# Patient Record
Sex: FEMALE | Race: WHITE | NOT HISPANIC OR LATINO | Employment: FULL TIME | URBAN - METROPOLITAN AREA
[De-identification: names, ages, dates, MRNs, and addresses within clinical notes are randomized per-mention and may not be internally consistent; named-entity substitution may affect disease eponyms.]

---

## 2017-07-21 PROBLEM — F98.8 ATTENTION DEFICIT DISORDER: Status: ACTIVE | Noted: 2017-07-21

## 2017-07-21 PROBLEM — E03.4 HYPOTHYROIDISM DUE TO ACQUIRED ATROPHY OF THYROID: Status: ACTIVE | Noted: 2017-07-21

## 2017-07-21 PROBLEM — F32.A DEPRESSION: Status: ACTIVE | Noted: 2017-07-21

## 2017-12-29 PROBLEM — K40.90 UNILATERAL INGUINAL HERNIA WITHOUT OBSTRUCTION OR GANGRENE: Status: ACTIVE | Noted: 2017-12-29

## 2018-01-19 RX ORDER — BUPROPION HYDROCHLORIDE 150 MG/1
TABLET, EXTENDED RELEASE ORAL
Qty: 30 TABLET | Refills: 0 | OUTPATIENT
Start: 2018-01-19

## 2018-01-24 PROBLEM — F33.41 RECURRENT MAJOR DEPRESSIVE DISORDER, IN PARTIAL REMISSION: Status: ACTIVE | Noted: 2017-07-21

## 2019-05-12 ENCOUNTER — HOSPITAL ENCOUNTER (EMERGENCY)
Facility: HOSPITAL | Age: 49
Discharge: HOME | End: 2019-05-12
Attending: EMERGENCY MEDICINE
Payer: COMMERCIAL

## 2019-05-12 VITALS
OXYGEN SATURATION: 100 % | BODY MASS INDEX: 19 KG/M2 | TEMPERATURE: 98 F | WEIGHT: 128.63 LBS | HEART RATE: 94 BPM | SYSTOLIC BLOOD PRESSURE: 120 MMHG | RESPIRATION RATE: 18 BRPM | DIASTOLIC BLOOD PRESSURE: 67 MMHG

## 2019-05-12 DIAGNOSIS — S39.012A STRAIN OF LUMBAR REGION, INITIAL ENCOUNTER: Primary | ICD-10-CM

## 2019-05-12 LAB
BILIRUB UR QL STRIP: NEGATIVE
CLARITY UR: CLEAR
COLOR UR: YELLOW
GLUCOSE UR QL STRIP: NEGATIVE
HGB UR QL STRIP: NEGATIVE
KETONES UR QL STRIP: NEGATIVE
LEUKOCYTE ESTERASE UR QL STRIP: NEGATIVE
NITRITE UR QL STRIP: NEGATIVE
PH UR STRIP: 6 [PH] (ref 5–8)
PROT UR QL STRIP: NEGATIVE
SP GR UR STRIP: >=1.03 (ref 1–1.03)
URN SPEC COLLECT METH UR: ABNORMAL
UROBILINOGEN UR STRIP-ACNC: NEGATIVE EU/DL

## 2019-05-12 PROCEDURE — 81003 URINALYSIS AUTO W/O SCOPE: CPT

## 2019-05-12 PROCEDURE — 99284 EMERGENCY DEPT VISIT MOD MDM: CPT | Mod: 25

## 2019-05-12 RX ORDER — METHOCARBAMOL 750 MG/1
1500 TABLET, FILM COATED ORAL 3 TIMES DAILY
Qty: 30 TABLET | Refills: 0 | Status: SHIPPED | OUTPATIENT
Start: 2019-05-12 | End: 2019-05-22

## 2019-05-12 RX ORDER — DICLOFENAC SODIUM 50 MG/1
50 TABLET, DELAYED RELEASE ORAL 2 TIMES DAILY
Qty: 20 TABLET | Refills: 0 | Status: SHIPPED | OUTPATIENT
Start: 2019-05-12 | End: 2019-06-18

## 2019-05-12 NOTE — ED PROVIDER NOTES
Encounter Date: 2019       History     Chief Complaint   Patient presents with    Flank Pain     also c/o nausea; history of recurrent UTIs since , has taken multiple rounds of bactrim and cipro     The history is provided by the patient.   Dysuria    This is a recurrent problem. The current episode started several days ago. The problem occurs every urination. The problem has been unchanged. The quality of the pain is described as aching. The pain is at a severity of 3/10. Associated symptoms include frequency, hesitancy, urgency and flank pain. Pertinent negatives include no chills, no sweats, no nausea, no vomiting, no discharge, no hematuria and no possible pregnancy. She has tried nothing for the symptoms.     Review of patient's allergies indicates:  No Known Allergies  Past Medical History:   Diagnosis Date    ADD (attention deficit disorder)     Hypothyroidism     Migraine      Past Surgical History:   Procedure Laterality Date    APPENDECTOMY      BREAST SURGERY      Augmentation      SECTION      HYSTERECTOMY      partial    INGUINAL HERNIA REPAIR Left 2017    KNEE SURGERY Left     REPAIR-HERNIA-INGUINAL-INITIAL (5 YRS+) with mesh Left 2017    Performed by Dennis Bush MD at [facility] OR     Family History   Problem Relation Age of Onset    Diabetes Mother     Colon cancer Mother     Cancer Father         prostate     COPD Maternal Grandmother     Rheum arthritis Maternal Grandmother     Hypothyroidism Maternal Grandmother     Cancer Maternal Grandfather     Diabetes Paternal Grandmother     Diverticulitis Paternal Grandmother     Parkinsonism Paternal Grandmother     Cancer Paternal Grandfather         prostate    Emphysema Paternal Grandfather         non-smoker     Cancer Sister      Social History     Tobacco Use    Smoking status: Never Smoker    Smokeless tobacco: Never Used   Substance Use Topics    Alcohol use: No    Drug use: No      Review of Systems   Constitutional: Negative for chills and fever.   HENT: Negative for sore throat.    Eyes: Negative for photophobia and redness.   Respiratory: Negative for cough and shortness of breath.    Cardiovascular: Negative for chest pain.   Gastrointestinal: Negative for abdominal pain, diarrhea, nausea and vomiting.   Endocrine: Negative for polydipsia and polyphagia.   Genitourinary: Positive for dysuria, flank pain, frequency, hesitancy and urgency. Negative for hematuria.   Musculoskeletal: Negative for arthralgias, back pain and myalgias.   Skin: Negative for rash.   Neurological: Negative for weakness and headaches.   Hematological: Does not bruise/bleed easily.   Psychiatric/Behavioral: The patient is not nervous/anxious.    All other systems reviewed and are negative.      Physical Exam     Initial Vitals [05/12/19 1307]   BP Pulse Resp Temp SpO2   120/67 94 18 98.3 °F (36.8 °C) 100 %      MAP       --         Physical Exam    Nursing note and vitals reviewed.  Constitutional: Vital signs are normal. She appears well-developed and well-nourished. No distress.   HENT:   Head: Normocephalic and atraumatic.   Right Ear: External ear normal.   Left Ear: External ear normal.   Nose: Nose normal.   Mouth/Throat: Oropharynx is clear and moist.   Eyes: Conjunctivae, EOM and lids are normal. Pupils are equal, round, and reactive to light.   Neck: Normal range of motion and full passive range of motion without pain. Neck supple.   Cardiovascular: Normal rate, regular rhythm, S1 normal, S2 normal, normal heart sounds, intact distal pulses and normal pulses.   Pulmonary/Chest: Breath sounds normal. No respiratory distress. She has no wheezes. She has no rales.   Abdominal: Soft. Normal appearance and bowel sounds are normal. She exhibits no distension. There is no tenderness.   Musculoskeletal: Normal range of motion.   Lymphadenopathy:     She has no cervical adenopathy.   Neurological: She is alert and  oriented to person, place, and time. She has normal strength. No cranial nerve deficit or sensory deficit. Coordination and gait normal.   Skin: Skin is warm, dry and intact.   Psychiatric: She has a normal mood and affect. Her speech is normal and behavior is normal. Judgment and thought content normal. Cognition and memory are normal.         ED Course   Procedures  Labs Reviewed   URINALYSIS, REFLEX TO URINE CULTURE - Abnormal; Notable for the following components:       Result Value    Specific Gravity, UA >=1.030 (*)     All other components within normal limits    Narrative:     Preferred Collection Type->Urine, Clean Catch          Imaging Results    None                               Clinical Impression:       ICD-10-CM ICD-9-CM   1. Strain of lumbar region, initial encounter S39.012A 847.2         Disposition:   Disposition: Discharged  Condition: Stable                        GARO Samayoa  05/12/19 1345

## 2020-01-24 PROBLEM — K58.1 IRRITABLE BOWEL SYNDROME WITH CONSTIPATION: Status: ACTIVE | Noted: 2020-01-24

## 2023-03-14 ENCOUNTER — PATIENT MESSAGE (OUTPATIENT)
Dept: UROLOGY | Facility: CLINIC | Age: 53
End: 2023-03-14

## 2023-03-14 ENCOUNTER — OFFICE VISIT (OUTPATIENT)
Dept: UROLOGY | Facility: CLINIC | Age: 53
End: 2023-03-14
Payer: COMMERCIAL

## 2023-03-14 VITALS
WEIGHT: 124.69 LBS | HEART RATE: 101 BPM | HEIGHT: 69 IN | DIASTOLIC BLOOD PRESSURE: 79 MMHG | TEMPERATURE: 97 F | SYSTOLIC BLOOD PRESSURE: 118 MMHG | BODY MASS INDEX: 18.47 KG/M2

## 2023-03-14 DIAGNOSIS — K59.09 OTHER CONSTIPATION: ICD-10-CM

## 2023-03-14 DIAGNOSIS — Z87.440 HISTORY OF RECURRENT UTIS: Primary | ICD-10-CM

## 2023-03-14 PROCEDURE — 99204 OFFICE O/P NEW MOD 45 MIN: CPT | Mod: S$GLB,,, | Performed by: NURSE PRACTITIONER

## 2023-03-14 PROCEDURE — 51798 US URINE CAPACITY MEASURE: CPT | Mod: S$GLB,ICN,, | Performed by: NURSE PRACTITIONER

## 2023-03-14 PROCEDURE — 81002 PR URINALYSIS NONAUTO W/O SCOPE: ICD-10-PCS | Mod: S$GLB,ICN,, | Performed by: NURSE PRACTITIONER

## 2023-03-14 PROCEDURE — 51798 PR MEAS,POST-VOID RES,US,NON-IMAGING: ICD-10-PCS | Mod: S$GLB,ICN,, | Performed by: NURSE PRACTITIONER

## 2023-03-14 PROCEDURE — 99215 OFFICE O/P EST HI 40 MIN: CPT | Mod: PBBFAC | Performed by: NURSE PRACTITIONER

## 2023-03-14 PROCEDURE — 99999 PR PBB SHADOW E&M-EST. PATIENT-LVL V: CPT | Mod: PBBFAC,,, | Performed by: NURSE PRACTITIONER

## 2023-03-14 PROCEDURE — 51798 US URINE CAPACITY MEASURE: CPT | Mod: PBBFAC

## 2023-03-14 PROCEDURE — 99999 PR PBB SHADOW E&M-EST. PATIENT-LVL V: ICD-10-PCS | Mod: PBBFAC,,, | Performed by: NURSE PRACTITIONER

## 2023-03-14 PROCEDURE — 81002 URINALYSIS NONAUTO W/O SCOPE: CPT | Mod: PBBFAC

## 2023-03-14 PROCEDURE — 81002 URINALYSIS NONAUTO W/O SCOPE: CPT | Mod: S$GLB,ICN,, | Performed by: NURSE PRACTITIONER

## 2023-03-14 PROCEDURE — 99204 PR OFFICE/OUTPT VISIT, NEW, LEVL IV, 45-59 MIN: ICD-10-PCS | Mod: S$GLB,,, | Performed by: NURSE PRACTITIONER

## 2023-03-14 NOTE — PATIENT INSTRUCTIONS
Pyelonephritis  Will proceed with renal ultrasound and patient to return to clinic in 4 weeks for re-evaluation    Chronic constipation  A referral for GI has been placed patient is aware that GI will contact them to schedule this appointment.

## 2023-03-14 NOTE — PROGRESS NOTES
Chief Complaint:   Pyelonephritis  Chronic constipation ( IBS)    HPI:   Patient is a 52-year-old female that is presenting with a history of recurrent urinary tract infections and was recently seen in ED for pyelonephritis.  Patient states that she was prescribed Cipro, twice, and is currently asymptomatic.  Urine in clinic is negative and PVR is 0 mL.  No history of gross hematuria or renal stones.  Patient states that she has chronic constipation, only has 2-3 bowel movements a month.  Reports that urine cultures always indicate E coli.    Allergies:  Patient has no known allergies.    Medications:  has a current medication list which includes the following prescription(s): amitriptyline, clonazepam, escitalopram oxalate, levothyroxine, quetiapine, valacyclovir, vyvanse, and ciprofloxacin hcl.    Review of Systems:  General: No fever, chills, fatigability, or weight loss.  Skin: No rashes, itching, or changes in color or texture of skin.  Chest: Denies QUINN, cyanosis, wheezing, cough, and sputum production.  Abdomen: Appetite fine. No weight loss. Denies diarrhea, abdominal pain, hematemesis, or blood in stool.  Reports Chronic constipation  Musculoskeletal: No joint stiffness or swelling. Denies back pain.  : As above.  All other review of systems negative.    PMH:   has a past medical history of ADD (attention deficit disorder), Hypothyroidism, Irritable bowel syndrome (IBS), Migraine, Post Traumatic Stress Disorder, and Therapeutic Drug Monitoring.    PSH:   has a past surgical history that includes  section; Knee surgery (Left); Appendectomy; Breast surgery; Hysterectomy; Inguinal hernia repair (Left, 2017); Colonoscopy (2019); and Augmentation of breast.    FamHx: family history includes BRCA 1/2 in her paternal aunt and paternal aunt; COPD in her maternal grandmother; Cancer in her father, maternal grandfather, paternal grandfather, and sister; Colon cancer in her mother; Diabetes in  her mother and paternal grandmother; Diverticulitis in her paternal grandmother; Emphysema in her paternal grandfather; Hypothyroidism in her maternal grandmother; Parkinsonism in her paternal grandmother; Rheum arthritis in her maternal grandmother.    SocHx:  reports that she has never smoked. She has never used smokeless tobacco. She reports that she does not drink alcohol and does not use drugs.      Physical Exam:  Vitals:    03/14/23 1413   BP: 118/79   Pulse: 101   Temp: 97.4 °F (36.3 °C)     General: A&Ox3, no apparent distress, no deformities  Neck: No masses, normal thyroid  Lungs: normal inspiration, no use of accessory muscles  Heart: normal pulse, no arrhythmias  Abdomen: Soft, NT, ND, no masses, no hernias, no hepatosplenomegaly  Lymphatic: Neck and groin nodes negative  Skin: The skin is warm and dry. No jaundice.    Labs/Studies:   Urine in clinic is negative  PVR is 0 mL  Impression/Plan:   1. Pyelonephritis with a history of recurrent urinary tract infections  Patient was educated on behavior modifications needed, in sexually active females, to decrease risk of pyelonephritis and recurrent cystitis.  Will proceed with renal ultrasound and patient to return to clinic for re-evaluation in 4 weeks.    2. Chronic constipation  A referral for GI has been placed.

## 2023-03-16 ENCOUNTER — HOSPITAL ENCOUNTER (OUTPATIENT)
Dept: RADIOLOGY | Facility: HOSPITAL | Age: 53
Discharge: HOME | End: 2023-03-16
Attending: NURSE PRACTITIONER
Payer: COMMERCIAL

## 2023-03-16 DIAGNOSIS — Z87.440 HISTORY OF RECURRENT UTIS: ICD-10-CM

## 2023-03-16 PROCEDURE — 76770 US EXAM ABDO BACK WALL COMP: CPT | Mod: TC

## 2023-03-16 PROCEDURE — 76770 US RETROPERITONEAL COMPLETE: ICD-10-PCS | Mod: 26,,, | Performed by: RADIOLOGY

## 2023-03-16 PROCEDURE — 76770 US EXAM ABDO BACK WALL COMP: CPT | Mod: 26,,, | Performed by: RADIOLOGY

## 2023-04-03 ENCOUNTER — OFFICE VISIT (OUTPATIENT)
Dept: OBSTETRICS AND GYNECOLOGY | Facility: CLINIC | Age: 53
End: 2023-04-03
Payer: COMMERCIAL

## 2023-04-03 ENCOUNTER — LAB VISIT (OUTPATIENT)
Dept: LAB | Facility: HOSPITAL | Age: 53
End: 2023-04-03
Attending: NURSE PRACTITIONER
Payer: COMMERCIAL

## 2023-04-03 VITALS
WEIGHT: 127.19 LBS | HEIGHT: 69 IN | DIASTOLIC BLOOD PRESSURE: 64 MMHG | BODY MASS INDEX: 18.84 KG/M2 | SYSTOLIC BLOOD PRESSURE: 102 MMHG

## 2023-04-03 DIAGNOSIS — Z11.3 SCREENING EXAMINATION FOR STD (SEXUALLY TRANSMITTED DISEASE): ICD-10-CM

## 2023-04-03 DIAGNOSIS — B00.9 HERPES: Primary | ICD-10-CM

## 2023-04-03 DIAGNOSIS — N94.9 GENITAL LESION, FEMALE: ICD-10-CM

## 2023-04-03 PROCEDURE — 99999 PR PBB SHADOW E&M-EST. PATIENT-LVL III: ICD-10-PCS | Mod: PBBFAC,,, | Performed by: NURSE PRACTITIONER

## 2023-04-03 PROCEDURE — 3008F PR BODY MASS INDEX (BMI) DOCUMENTED: ICD-10-PCS | Mod: CPTII,S$GLB,, | Performed by: NURSE PRACTITIONER

## 2023-04-03 PROCEDURE — 36415 COLL VENOUS BLD VENIPUNCTURE: CPT | Performed by: NURSE PRACTITIONER

## 2023-04-03 PROCEDURE — 1160F RVW MEDS BY RX/DR IN RCRD: CPT | Mod: CPTII,S$GLB,, | Performed by: NURSE PRACTITIONER

## 2023-04-03 PROCEDURE — 1160F PR REVIEW ALL MEDS BY PRESCRIBER/CLIN PHARMACIST DOCUMENTED: ICD-10-PCS | Mod: CPTII,S$GLB,, | Performed by: NURSE PRACTITIONER

## 2023-04-03 PROCEDURE — 99999 PR PBB SHADOW E&M-EST. PATIENT-LVL III: CPT | Mod: PBBFAC,,, | Performed by: NURSE PRACTITIONER

## 2023-04-03 PROCEDURE — 3078F PR MOST RECENT DIASTOLIC BLOOD PRESSURE < 80 MM HG: ICD-10-PCS | Mod: CPTII,S$GLB,, | Performed by: NURSE PRACTITIONER

## 2023-04-03 PROCEDURE — 1159F PR MEDICATION LIST DOCUMENTED IN MEDICAL RECORD: ICD-10-PCS | Mod: CPTII,S$GLB,, | Performed by: NURSE PRACTITIONER

## 2023-04-03 PROCEDURE — 3074F PR MOST RECENT SYSTOLIC BLOOD PRESSURE < 130 MM HG: ICD-10-PCS | Mod: CPTII,S$GLB,, | Performed by: NURSE PRACTITIONER

## 2023-04-03 PROCEDURE — 99203 PR OFFICE/OUTPT VISIT, NEW, LEVL III, 30-44 MIN: ICD-10-PCS | Mod: S$GLB,,, | Performed by: NURSE PRACTITIONER

## 2023-04-03 PROCEDURE — 1159F MED LIST DOCD IN RCRD: CPT | Mod: CPTII,S$GLB,, | Performed by: NURSE PRACTITIONER

## 2023-04-03 PROCEDURE — 99203 OFFICE O/P NEW LOW 30 MIN: CPT | Mod: S$GLB,,, | Performed by: NURSE PRACTITIONER

## 2023-04-03 PROCEDURE — 86694 HERPES SIMPLEX NES ANTBDY: CPT | Performed by: NURSE PRACTITIONER

## 2023-04-03 PROCEDURE — 3078F DIAST BP <80 MM HG: CPT | Mod: CPTII,S$GLB,, | Performed by: NURSE PRACTITIONER

## 2023-04-03 PROCEDURE — 3008F BODY MASS INDEX DOCD: CPT | Mod: CPTII,S$GLB,, | Performed by: NURSE PRACTITIONER

## 2023-04-03 PROCEDURE — 3074F SYST BP LT 130 MM HG: CPT | Mod: CPTII,S$GLB,, | Performed by: NURSE PRACTITIONER

## 2023-04-03 RX ORDER — VALACYCLOVIR HYDROCHLORIDE 1 G/1
1000 TABLET, FILM COATED ORAL DAILY
Qty: 30 TABLET | Refills: 11 | Status: SHIPPED | OUTPATIENT
Start: 2023-04-03 | End: 2023-12-26 | Stop reason: SDUPTHER

## 2023-04-03 NOTE — PROGRESS NOTES
Subjective:       Patient ID: Tamiko Bettencourt is a 52 y.o. female.    Chief Complaint:  STD CHECK    No LMP recorded. Patient has had a hysterectomy.  History of Present Illness    Presents today because she has bump to her vulva and she thinks it may be a herpes breakout.  Also states that she does get fever blisters that she treats with valacyclovir   Desires HSV bloodwork although she has a history of fever blisters  OB History    Para Term  AB Living   2 2 2         SAB IAB Ectopic Multiple Live Births                  # Outcome Date GA Lbr Vinod/2nd Weight Sex Delivery Anes PTL Lv   2 Term            1 Term                Review of Systems  Review of Systems        Objective:    Physical Exam  Genitourinary:     General: Normal vulva.      Exam position: Lithotomy position.      Comments: No bumps seen today         Assessment:     1. Herpes    2. Screening examination for STD (sexually transmitted disease)              Plan:   Tamiko was seen today for std check.    Diagnoses and all orders for this visit:    Herpes  -     valACYclovir (VALTREX) 1000 MG tablet; Take 1 tablet (1,000 mg total) by mouth once daily.    Screening examination for STD (sexually transmitted disease)  -     HERPES SIMPLEX 1 & 2 IGM; Future    Return to clinic for further evaluation if bumps re appear  Desires prescription for valtrex due to fever blisters

## 2023-04-06 LAB — HSV AB, IGM BY EIA: 0.36 INDEX

## 2023-06-06 ENCOUNTER — OFFICE VISIT (OUTPATIENT)
Dept: UROLOGY | Facility: CLINIC | Age: 53
End: 2023-06-06
Payer: COMMERCIAL

## 2023-06-06 VITALS
HEART RATE: 102 BPM | RESPIRATION RATE: 18 BRPM | BODY MASS INDEX: 19.03 KG/M2 | DIASTOLIC BLOOD PRESSURE: 80 MMHG | SYSTOLIC BLOOD PRESSURE: 131 MMHG | WEIGHT: 128.88 LBS

## 2023-06-06 DIAGNOSIS — N39.0 RECURRENT UTI: Primary | ICD-10-CM

## 2023-06-06 PROCEDURE — 87186 SC STD MICRODIL/AGAR DIL: CPT | Performed by: NURSE PRACTITIONER

## 2023-06-06 PROCEDURE — 99999 PR PBB SHADOW E&M-EST. PATIENT-LVL III: ICD-10-PCS | Mod: PBBFAC,,, | Performed by: NURSE PRACTITIONER

## 2023-06-06 PROCEDURE — 3075F SYST BP GE 130 - 139MM HG: CPT | Mod: CPTII,S$GLB,, | Performed by: NURSE PRACTITIONER

## 2023-06-06 PROCEDURE — 87077 CULTURE AEROBIC IDENTIFY: CPT | Performed by: NURSE PRACTITIONER

## 2023-06-06 PROCEDURE — 3079F DIAST BP 80-89 MM HG: CPT | Mod: CPTII,S$GLB,, | Performed by: NURSE PRACTITIONER

## 2023-06-06 PROCEDURE — 99214 PR OFFICE/OUTPT VISIT, EST, LEVL IV, 30-39 MIN: ICD-10-PCS | Mod: S$GLB,,, | Performed by: NURSE PRACTITIONER

## 2023-06-06 PROCEDURE — 3008F PR BODY MASS INDEX (BMI) DOCUMENTED: ICD-10-PCS | Mod: CPTII,S$GLB,, | Performed by: NURSE PRACTITIONER

## 2023-06-06 PROCEDURE — 87086 URINE CULTURE/COLONY COUNT: CPT | Performed by: NURSE PRACTITIONER

## 2023-06-06 PROCEDURE — 3075F PR MOST RECENT SYSTOLIC BLOOD PRESS GE 130-139MM HG: ICD-10-PCS | Mod: CPTII,S$GLB,, | Performed by: NURSE PRACTITIONER

## 2023-06-06 PROCEDURE — 87088 URINE BACTERIA CULTURE: CPT | Performed by: NURSE PRACTITIONER

## 2023-06-06 PROCEDURE — 99999 PR PBB SHADOW E&M-EST. PATIENT-LVL III: CPT | Mod: PBBFAC,,, | Performed by: NURSE PRACTITIONER

## 2023-06-06 PROCEDURE — 3079F PR MOST RECENT DIASTOLIC BLOOD PRESSURE 80-89 MM HG: ICD-10-PCS | Mod: CPTII,S$GLB,, | Performed by: NURSE PRACTITIONER

## 2023-06-06 PROCEDURE — 1159F MED LIST DOCD IN RCRD: CPT | Mod: CPTII,S$GLB,, | Performed by: NURSE PRACTITIONER

## 2023-06-06 PROCEDURE — 3008F BODY MASS INDEX DOCD: CPT | Mod: CPTII,S$GLB,, | Performed by: NURSE PRACTITIONER

## 2023-06-06 PROCEDURE — 1159F PR MEDICATION LIST DOCUMENTED IN MEDICAL RECORD: ICD-10-PCS | Mod: CPTII,S$GLB,, | Performed by: NURSE PRACTITIONER

## 2023-06-06 PROCEDURE — 99214 OFFICE O/P EST MOD 30 MIN: CPT | Mod: S$GLB,,, | Performed by: NURSE PRACTITIONER

## 2023-06-06 RX ORDER — CIPROFLOXACIN 500 MG/1
500 TABLET ORAL 2 TIMES DAILY
Qty: 14 TABLET | Refills: 0 | Status: SHIPPED | OUTPATIENT
Start: 2023-06-06 | End: 2023-06-13

## 2023-06-06 NOTE — PROGRESS NOTES
Chief Complaint:   Recurrent E coli cystitis    HPI:   Patient is a 52-year-old female with a history of E coli cystitis and pyelonephritis.  Patient was seen several months ago and was to return to clinic for a re-evaluation, unfortunately, she was lost to follow-up presenting to urgent care setting.  Patient was prescribed multiple antibiotics, no urine cultures to review.  Urine in clinic indicates leukocytes and nitrates, all other parameters are negative.  Patient has a CT abdomen and pelvis scheduled for this Friday, secondary to lower abdominal pain.  Denies gross hematuria.  03/14/2023  Patient is a 52-year-old female that is presenting with a history of recurrent urinary tract infections and was recently seen in ED for pyelonephritis.  Patient states that she was prescribed Cipro, twice, and is currently asymptomatic.  Urine in clinic is negative and PVR is 0 mL.  No history of gross hematuria or renal stones. Patient states that she has chronic constipation, only has 2-3 bowel movements a month.  Reports that urine cultures always indicate E coli.         Allergies:  Patient has no known allergies.    Medications:  has a current medication list which includes the following prescription(s): amitriptyline, clonazepam, escitalopram oxalate, levothyroxine, quetiapine, vyvanse, ciprofloxacin hcl, and valacyclovir.    Review of Systems:  General: No fever, chills, fatigability, or weight loss.  Skin: No rashes, itching, or changes in color or texture of skin.  Chest: Denies QUINN, cyanosis, wheezing, cough, and sputum production.  Abdomen: Appetite fine. No weight loss. Denies diarrhea, abdominal pain, hematemesis, or blood in stool.  Musculoskeletal: No joint stiffness or swelling. Denies back pain.  : As above.  All other review of systems negative.    PMH:   has a past medical history of ADD (attention deficit disorder), Hypothyroidism, Irritable bowel syndrome (IBS), Migraine, Post Traumatic Stress Disorder,  and Therapeutic Drug Monitoring.    PSH:   has a past surgical history that includes  section; Knee surgery (Left); Appendectomy; Breast surgery; Hysterectomy (2010); Inguinal hernia repair (Left, 2017); Colonoscopy (2019); and Augmentation of breast.    FamHx: family history includes Breast cancer in her paternal aunt, paternal aunt and another family member; COPD in her maternal grandmother; Cancer in her maternal grandfather and sister; Colon cancer in her mother; Diabetes in her mother and paternal grandmother; Diverticulitis in her paternal grandmother; Emphysema in her paternal grandfather; Hypothyroidism in her maternal grandmother; Parkinsonism in her paternal grandmother; Prostate cancer in her father and paternal grandfather; Rheum arthritis in her maternal grandmother.    SocHx:  reports that she has never smoked. She has never used smokeless tobacco. She reports that she does not drink alcohol and does not use drugs.      Physical Exam:  Vitals:    23 1449   BP: 131/80   Pulse: 102   Resp: 18     General: A&Ox3, no apparent distress, no deformities  Neck: No masses, normal thyroid  Lungs: normal inspiration, no use of accessory muscles  Heart: normal pulse, no arrhythmias  Abdomen: Soft, NT, ND, no masses, no hernias, no hepatosplenomegaly  Lymphatic: Neck and groin nodes negative  Skin: The skin is warm and dry. No jaundice.    Labs/Studies:   See HPI    Impression/Plan:   Recurrent E coli cystitis  I will review CT scan abdomen and pelvis results and contact patient to discuss.  Urine was sent for culture and patient was empirically treated with Cipro.  Patient to return to clinic in 2 weeks for re-evaluation, at that visit, I will consider Estrace cream and Hiprex.

## 2023-06-09 LAB — BACTERIA UR CULT: ABNORMAL

## 2023-06-13 ENCOUNTER — PATIENT MESSAGE (OUTPATIENT)
Dept: UROLOGY | Facility: CLINIC | Age: 53
End: 2023-06-13
Payer: COMMERCIAL

## 2023-06-19 ENCOUNTER — PATIENT MESSAGE (OUTPATIENT)
Dept: UROLOGY | Facility: CLINIC | Age: 53
End: 2023-06-19
Payer: COMMERCIAL

## 2023-06-21 ENCOUNTER — OFFICE VISIT (OUTPATIENT)
Dept: UROLOGY | Facility: CLINIC | Age: 53
End: 2023-06-21
Payer: COMMERCIAL

## 2023-06-21 VITALS
HEART RATE: 120 BPM | HEIGHT: 69 IN | SYSTOLIC BLOOD PRESSURE: 116 MMHG | DIASTOLIC BLOOD PRESSURE: 77 MMHG | BODY MASS INDEX: 18.97 KG/M2 | WEIGHT: 128.06 LBS | RESPIRATION RATE: 18 BRPM

## 2023-06-21 DIAGNOSIS — N39.0 RECURRENT UTI: Primary | ICD-10-CM

## 2023-06-21 LAB
BILIRUB SERPL-MCNC: NEGATIVE MG/DL
BLOOD URINE, POC: NEGATIVE
CLARITY, POC UA: CLEAR
COLOR, POC UA: YELLOW
GLUCOSE UR QL STRIP: NEGATIVE
KETONES UR QL STRIP: NEGATIVE
LEUKOCYTE ESTERASE URINE, POC: NORMAL
NITRITE, POC UA: NEGATIVE
PH, POC UA: 6
PROTEIN, POC: NEGATIVE
SPECIFIC GRAVITY, POC UA: 1.02
UROBILINOGEN, POC UA: NORMAL

## 2023-06-21 PROCEDURE — 3078F DIAST BP <80 MM HG: CPT | Mod: CPTII,S$GLB,, | Performed by: NURSE PRACTITIONER

## 2023-06-21 PROCEDURE — 3008F PR BODY MASS INDEX (BMI) DOCUMENTED: ICD-10-PCS | Mod: CPTII,S$GLB,, | Performed by: NURSE PRACTITIONER

## 2023-06-21 PROCEDURE — 99214 OFFICE O/P EST MOD 30 MIN: CPT | Mod: S$GLB,,, | Performed by: NURSE PRACTITIONER

## 2023-06-21 PROCEDURE — 99214 PR OFFICE/OUTPT VISIT, EST, LEVL IV, 30-39 MIN: ICD-10-PCS | Mod: S$GLB,,, | Performed by: NURSE PRACTITIONER

## 2023-06-21 PROCEDURE — 81002 POCT URINE DIPSTICK WITHOUT MICROSCOPE: ICD-10-PCS | Mod: S$GLB,,, | Performed by: NURSE PRACTITIONER

## 2023-06-21 PROCEDURE — 1159F MED LIST DOCD IN RCRD: CPT | Mod: CPTII,S$GLB,, | Performed by: NURSE PRACTITIONER

## 2023-06-21 PROCEDURE — 3008F BODY MASS INDEX DOCD: CPT | Mod: CPTII,S$GLB,, | Performed by: NURSE PRACTITIONER

## 2023-06-21 PROCEDURE — 3074F SYST BP LT 130 MM HG: CPT | Mod: CPTII,S$GLB,, | Performed by: NURSE PRACTITIONER

## 2023-06-21 PROCEDURE — 3074F PR MOST RECENT SYSTOLIC BLOOD PRESSURE < 130 MM HG: ICD-10-PCS | Mod: CPTII,S$GLB,, | Performed by: NURSE PRACTITIONER

## 2023-06-21 PROCEDURE — 99999 PR PBB SHADOW E&M-EST. PATIENT-LVL III: ICD-10-PCS | Mod: PBBFAC,,, | Performed by: NURSE PRACTITIONER

## 2023-06-21 PROCEDURE — 1159F PR MEDICATION LIST DOCUMENTED IN MEDICAL RECORD: ICD-10-PCS | Mod: CPTII,S$GLB,, | Performed by: NURSE PRACTITIONER

## 2023-06-21 PROCEDURE — 3078F PR MOST RECENT DIASTOLIC BLOOD PRESSURE < 80 MM HG: ICD-10-PCS | Mod: CPTII,S$GLB,, | Performed by: NURSE PRACTITIONER

## 2023-06-21 PROCEDURE — 99999 PR PBB SHADOW E&M-EST. PATIENT-LVL III: CPT | Mod: PBBFAC,,, | Performed by: NURSE PRACTITIONER

## 2023-06-21 PROCEDURE — 81002 URINALYSIS NONAUTO W/O SCOPE: CPT | Mod: S$GLB,,, | Performed by: NURSE PRACTITIONER

## 2023-06-21 RX ORDER — NITROFURANTOIN 25; 75 MG/1; MG/1
100 CAPSULE ORAL NIGHTLY
Qty: 90 CAPSULE | Refills: 0 | Status: SHIPPED | OUTPATIENT
Start: 2023-06-21 | End: 2023-09-08

## 2023-06-21 NOTE — PROGRESS NOTES
Chief Complaint:   Recurrent urinary tract infections    HPI:   Patient is a 52-year-old female that is presenting as a follow-up to recurrent urinary tract infections.  Patient recently was treated for E coli cystitis.  Urine in clinic is negative and PVR is 13 mL.  Patient is currently asymptomatic.  Had a recent CT abdomen pelvis that was negative for renal abnormalities.  Patient states that she has severe constipation and only defecates every 10-12 days.  Reports that E coli UTIs are associated with her bowel prep for defecation.  03/14/2023  Patient is a 52-year-old female that is presenting with a history of recurrent urinary tract infections and was recently seen in ED for pyelonephritis.  Patient states that she was prescribed Cipro, twice, and is currently asymptomatic.  Urine in clinic is negative and PVR is 0 mL.  No history of gross hematuria or renal stones.  Patient states that she has chronic constipation, only has 2-3 bowel movements a month.  Reports that urine cultures always indicate E coli.    Allergies:  Patient has no known allergies.    Medications:  has a current medication list which includes the following prescription(s): amitriptyline, clonazepam, escitalopram oxalate, levothyroxine, quetiapine, vyvanse, nitrofurantoin (macrocrystal-monohydrate), and valacyclovir.    Review of Systems:  General: No fever, chills, fatigability, or weight loss.  Skin: No rashes, itching, or changes in color or texture of skin.  Chest: Denies QUINN, cyanosis, wheezing, cough, and sputum production.  Abdomen: Appetite fine. No weight loss. Denies diarrhea, abdominal pain, hematemesis, or blood in stool.  Severe constipation  Musculoskeletal: No joint stiffness or swelling. Denies back pain.  : As above.  All other review of systems negative.    PMH:   has a past medical history of ADD (attention deficit disorder), Hypothyroidism, Irritable bowel syndrome (IBS), Migraine, Post Traumatic Stress Disorder, and  Therapeutic Drug Monitoring.    PSH:   has a past surgical history that includes  section; Knee surgery (Left); Appendectomy; Breast surgery; Hysterectomy (2010); Inguinal hernia repair (Left, 2017); Colonoscopy (2019); and Augmentation of breast.    FamHx: family history includes Breast cancer in her paternal aunt, paternal aunt and another family member; COPD in her maternal grandmother; Cancer in her maternal grandfather and sister; Colon cancer in her mother; Diabetes in her mother and paternal grandmother; Diverticulitis in her paternal grandmother; Emphysema in her paternal grandfather; Hypothyroidism in her maternal grandmother; Parkinsonism in her paternal grandmother; Prostate cancer in her father and paternal grandfather; Rheum arthritis in her maternal grandmother.    SocHx:  reports that she has never smoked. She has never used smokeless tobacco. She reports that she does not drink alcohol and does not use drugs.      Physical Exam:  Vitals:    23 1554   BP: 116/77   Pulse: (!) 120   Resp: 18     General: A&Ox3, no apparent distress, no deformities  Neck: No masses, normal thyroid  Lungs: normal inspiration, no use of accessory muscles  Heart: normal pulse, no arrhythmias  Abdomen: Soft, NT, ND, no masses, no hernias, no hepatosplenomegaly  Lymphatic: Neck and groin nodes negative  Labs/Studies:   See HPI    Impression/Plan:   Recurrent urinary tract Infections   I reached out to GI to see if patient can get a sooner appointment been scheduled for recurrent UTIs associated with E coli and severe constipation.  Patient was prescribed nightly Macrodantin 100 mg for 3 months and return to clinic in 4 months for re-evaluation.  Hopefully, she can resolve the constipation within the 3 month time frame and we can discontinue the nightly antibiotics.

## 2023-07-13 ENCOUNTER — OFFICE VISIT (OUTPATIENT)
Dept: UROLOGY | Facility: CLINIC | Age: 53
End: 2023-07-13
Payer: COMMERCIAL

## 2023-07-13 ENCOUNTER — PATIENT MESSAGE (OUTPATIENT)
Dept: INFECTIOUS DISEASES | Facility: CLINIC | Age: 53
End: 2023-07-13
Payer: COMMERCIAL

## 2023-07-13 VITALS
SYSTOLIC BLOOD PRESSURE: 128 MMHG | WEIGHT: 128.06 LBS | BODY MASS INDEX: 18.97 KG/M2 | HEART RATE: 114 BPM | HEIGHT: 69 IN | DIASTOLIC BLOOD PRESSURE: 80 MMHG

## 2023-07-13 DIAGNOSIS — N39.0 RECURRENT UTI: Primary | ICD-10-CM

## 2023-07-13 PROCEDURE — 3074F SYST BP LT 130 MM HG: CPT | Mod: CPTII,S$GLB,, | Performed by: NURSE PRACTITIONER

## 2023-07-13 PROCEDURE — 1159F MED LIST DOCD IN RCRD: CPT | Mod: CPTII,S$GLB,, | Performed by: NURSE PRACTITIONER

## 2023-07-13 PROCEDURE — 3079F DIAST BP 80-89 MM HG: CPT | Mod: CPTII,S$GLB,, | Performed by: NURSE PRACTITIONER

## 2023-07-13 PROCEDURE — 87186 SC STD MICRODIL/AGAR DIL: CPT | Performed by: NURSE PRACTITIONER

## 2023-07-13 PROCEDURE — 3079F PR MOST RECENT DIASTOLIC BLOOD PRESSURE 80-89 MM HG: ICD-10-PCS | Mod: CPTII,S$GLB,, | Performed by: NURSE PRACTITIONER

## 2023-07-13 PROCEDURE — 99999 PR PBB SHADOW E&M-EST. PATIENT-LVL IV: CPT | Mod: PBBFAC,,, | Performed by: NURSE PRACTITIONER

## 2023-07-13 PROCEDURE — 3008F PR BODY MASS INDEX (BMI) DOCUMENTED: ICD-10-PCS | Mod: CPTII,S$GLB,, | Performed by: NURSE PRACTITIONER

## 2023-07-13 PROCEDURE — 3074F PR MOST RECENT SYSTOLIC BLOOD PRESSURE < 130 MM HG: ICD-10-PCS | Mod: CPTII,S$GLB,, | Performed by: NURSE PRACTITIONER

## 2023-07-13 PROCEDURE — 87088 URINE BACTERIA CULTURE: CPT | Performed by: NURSE PRACTITIONER

## 2023-07-13 PROCEDURE — 99999 PR PBB SHADOW E&M-EST. PATIENT-LVL IV: ICD-10-PCS | Mod: PBBFAC,,, | Performed by: NURSE PRACTITIONER

## 2023-07-13 PROCEDURE — 87086 URINE CULTURE/COLONY COUNT: CPT | Performed by: NURSE PRACTITIONER

## 2023-07-13 PROCEDURE — 99214 OFFICE O/P EST MOD 30 MIN: CPT | Mod: S$GLB,,, | Performed by: NURSE PRACTITIONER

## 2023-07-13 PROCEDURE — 87077 CULTURE AEROBIC IDENTIFY: CPT | Performed by: NURSE PRACTITIONER

## 2023-07-13 PROCEDURE — 1159F PR MEDICATION LIST DOCUMENTED IN MEDICAL RECORD: ICD-10-PCS | Mod: CPTII,S$GLB,, | Performed by: NURSE PRACTITIONER

## 2023-07-13 PROCEDURE — 99214 PR OFFICE/OUTPT VISIT, EST, LEVL IV, 30-39 MIN: ICD-10-PCS | Mod: S$GLB,,, | Performed by: NURSE PRACTITIONER

## 2023-07-13 PROCEDURE — 3008F BODY MASS INDEX DOCD: CPT | Mod: CPTII,S$GLB,, | Performed by: NURSE PRACTITIONER

## 2023-07-13 RX ORDER — CIPROFLOXACIN 500 MG/1
500 TABLET ORAL 2 TIMES DAILY
Qty: 20 TABLET | Refills: 0 | Status: SHIPPED | OUTPATIENT
Start: 2023-07-13 | End: 2023-07-23

## 2023-07-13 NOTE — PROGRESS NOTES
Chief Complaint:   Recurrent E coli cystitis    HPI:   Patient has been followed by this clinic for recurrent E coli cystitis.  Was recently prescribed nightly antibiotics, Macrobid, for 3 months.  Unfortunately, patient reports dysuria and pelvic pain for 2 days.  Urine in clinic indicates nitrates leukocytes and trace blood, all other parameters are negative.  Denies gross hematuria or fever.  Patient has a long history of chronic constipation, defecating every 10-12 days, requiring bowel prep for elimination.  Is followed by GI.  06/23/2023  Patient is a 52-year-old female that is presenting as a follow-up to recurrent urinary tract infections.  Patient recently was treated for E coli cystitis.  Urine in clinic is negative and PVR is 13 mL.  Patient is currently asymptomatic.  Had a recent CT abdomen pelvis that was negative for renal abnormalities.  Patient states that she has severe constipation and only defecates every 10-12 days.  Reports that E coli UTIs are associated with her bowel prep for defecation.  03/14/2023  Patient is a 52-year-old female that is presenting with a history of recurrent urinary tract infections and was recently seen in ED for pyelonephritis.  Patient states that she was prescribed Cipro, twice, and is currently asymptomatic.  Urine in clinic is negative and PVR is 0 mL.  No history of gross hematuria or renal stones.  Patient states that she has chronic constipation, only has 2-3 bowel movements a month.  Reports that urine cultures always indicate E coli.    Allergies:  Patient has no known allergies.    Medications:  has a current medication list which includes the following prescription(s): amitriptyline, clonazepam, escitalopram oxalate, levothyroxine, nitrofurantoin (macrocrystal-monohydrate), quetiapine, vyvanse, and valacyclovir.    Review of Systems:  General: No fever, chills, fatigability, or weight loss.  Skin: No rashes, itching, or changes in color or texture of  skin.  Chest: Denies QUINN, cyanosis, wheezing, cough, and sputum production.  Abdomen: Appetite fine. No weight loss. Denies diarrhea, abdominal pain, hematemesis, or blood in stool.  Musculoskeletal: No joint stiffness or swelling. Denies back pain.  : As above.  All other review of systems negative.    PMH:   has a past medical history of ADD (attention deficit disorder), Hypothyroidism, Irritable bowel syndrome (IBS), Migraine, Post Traumatic Stress Disorder, and Therapeutic Drug Monitoring.    PSH:   has a past surgical history that includes  section; Knee surgery (Left); Appendectomy; Breast surgery; Hysterectomy (); Inguinal hernia repair (Left, 2017); Colonoscopy (2019); and Augmentation of breast.    FamHx: family history includes Breast cancer in her paternal aunt, paternal aunt and another family member; COPD in her maternal grandmother; Cancer in her maternal grandfather and sister; Colon cancer in her mother; Diabetes in her mother and paternal grandmother; Diverticulitis in her paternal grandmother; Emphysema in her paternal grandfather; Hypothyroidism in her maternal grandmother; Parkinsonism in her paternal grandmother; Prostate cancer in her father and paternal grandfather; Rheum arthritis in her maternal grandmother.    SocHx:  reports that she has never smoked. She has never used smokeless tobacco. She reports that she does not drink alcohol and does not use drugs.      Physical Exam:  Vitals:    23 1319   BP: 128/80   Pulse: (!) 114     General: A&Ox3, no apparent distress, no deformities  Neck: No masses, normal thyroid  Lungs: normal inspiration, no use of accessory muscles  Heart: normal pulse, no arrhythmias  Abdomen: Soft, NT, ND, no masses, no hernias, no hepatosplenomegaly  Lymphatic: Neck and groin nodes negative  Skin: The skin is warm and dry. No jaundice.    Labs/Studies:   See HPI    Impression/Plan:   Recurrent E coli cystitis with a history of  pyelonephritis  Patient has not had resolution with nightly antibiotics, therefore, referral for infectious Disease was placed.  Patient to hold Macrobid and Cipro was prescribed and sent to her pharmacy for empirical treatment.  Urine will be sent for culture and I will contact her with final urine culture results.

## 2023-07-15 LAB — BACTERIA UR CULT: ABNORMAL

## 2023-07-17 ENCOUNTER — TELEPHONE (OUTPATIENT)
Dept: UROLOGY | Facility: CLINIC | Age: 53
End: 2023-07-17
Payer: COMMERCIAL

## 2023-07-17 NOTE — TELEPHONE ENCOUNTER
Called pt to inform of results. No answer  ----- Message from Bessy Church NP sent at 7/17/2023  6:28 AM CDT -----  Please contact patient inform her that final urine culture indicated E coli and she is to complete Cipro.  After completion of Cipro she can began her nightly maintenance dose of Macrodantin.

## 2023-07-17 NOTE — TELEPHONE ENCOUNTER
----- Message from Max Castle sent at 7/17/2023 11:32 AM CDT -----  Contact: Shahla  Type:  Patient Returning Call    Who Called:Shahla  Who Left Message for Patient:nurse  Does the patient know what this is regarding?:missed call/restults  Would the patient rather a call back or a response via [facility]? =call  Best Call Back Number:[phone]  Additional Information:

## 2023-07-17 NOTE — TELEPHONE ENCOUNTER
I called patient she is aware to complete Cipro and that Infectious Disease referral has been placed.

## 2023-07-17 NOTE — TELEPHONE ENCOUNTER
----- Message from Max Castle sent at 7/17/2023 11:32 AM CDT -----  Contact: Shahla  Type:  Patient Returning Call    Who Called:Shahla  Who Left Message for Patient:nurse  Does the patient know what this is regarding?:missed call/restults  Would the patient rather a call back or a response via [facility]? =call  Best Call Back Number:[phone]  Additional Information:

## 2023-08-23 ENCOUNTER — OFFICE VISIT (OUTPATIENT)
Dept: UROLOGY | Facility: CLINIC | Age: 53
End: 2023-08-23
Payer: COMMERCIAL

## 2023-08-23 VITALS — SYSTOLIC BLOOD PRESSURE: 123 MMHG | DIASTOLIC BLOOD PRESSURE: 78 MMHG | WEIGHT: 128 LBS | BODY MASS INDEX: 18.9 KG/M2

## 2023-08-23 DIAGNOSIS — N39.0 RECURRENT UTI: Primary | ICD-10-CM

## 2023-08-23 LAB
BILIRUB SERPL-MCNC: NORMAL MG/DL
BLOOD URINE, POC: NORMAL
CLARITY, POC UA: CLEAR
COLOR, POC UA: YELLOW
GLUCOSE UR QL STRIP: NORMAL
KETONES UR QL STRIP: NORMAL
LEUKOCYTE ESTERASE URINE, POC: NORMAL
NITRITE, POC UA: NORMAL
PH, POC UA: 6
PROTEIN, POC: NORMAL
SPECIFIC GRAVITY, POC UA: 1.02
UROBILINOGEN, POC UA: 0.2

## 2023-08-23 PROCEDURE — 99214 OFFICE O/P EST MOD 30 MIN: CPT | Mod: S$GLB,,, | Performed by: NURSE PRACTITIONER

## 2023-08-23 PROCEDURE — 3078F PR MOST RECENT DIASTOLIC BLOOD PRESSURE < 80 MM HG: ICD-10-PCS | Mod: CPTII,S$GLB,, | Performed by: NURSE PRACTITIONER

## 2023-08-23 PROCEDURE — 81002 POCT URINE DIPSTICK WITHOUT MICROSCOPE: ICD-10-PCS | Mod: S$GLB,,, | Performed by: NURSE PRACTITIONER

## 2023-08-23 PROCEDURE — 1159F PR MEDICATION LIST DOCUMENTED IN MEDICAL RECORD: ICD-10-PCS | Mod: CPTII,S$GLB,, | Performed by: NURSE PRACTITIONER

## 2023-08-23 PROCEDURE — 81002 URINALYSIS NONAUTO W/O SCOPE: CPT | Mod: S$GLB,,, | Performed by: NURSE PRACTITIONER

## 2023-08-23 PROCEDURE — 1159F MED LIST DOCD IN RCRD: CPT | Mod: CPTII,S$GLB,, | Performed by: NURSE PRACTITIONER

## 2023-08-23 PROCEDURE — 3074F PR MOST RECENT SYSTOLIC BLOOD PRESSURE < 130 MM HG: ICD-10-PCS | Mod: CPTII,S$GLB,, | Performed by: NURSE PRACTITIONER

## 2023-08-23 PROCEDURE — 99214 PR OFFICE/OUTPT VISIT, EST, LEVL IV, 30-39 MIN: ICD-10-PCS | Mod: S$GLB,,, | Performed by: NURSE PRACTITIONER

## 2023-08-23 PROCEDURE — 3074F SYST BP LT 130 MM HG: CPT | Mod: CPTII,S$GLB,, | Performed by: NURSE PRACTITIONER

## 2023-08-23 PROCEDURE — 99999 PR PBB SHADOW E&M-EST. PATIENT-LVL III: ICD-10-PCS | Mod: PBBFAC,,, | Performed by: NURSE PRACTITIONER

## 2023-08-23 PROCEDURE — 3008F PR BODY MASS INDEX (BMI) DOCUMENTED: ICD-10-PCS | Mod: CPTII,S$GLB,, | Performed by: NURSE PRACTITIONER

## 2023-08-23 PROCEDURE — 3008F BODY MASS INDEX DOCD: CPT | Mod: CPTII,S$GLB,, | Performed by: NURSE PRACTITIONER

## 2023-08-23 PROCEDURE — 3078F DIAST BP <80 MM HG: CPT | Mod: CPTII,S$GLB,, | Performed by: NURSE PRACTITIONER

## 2023-08-23 PROCEDURE — 99999 PR PBB SHADOW E&M-EST. PATIENT-LVL III: CPT | Mod: PBBFAC,,, | Performed by: NURSE PRACTITIONER

## 2023-08-23 RX ORDER — CIPROFLOXACIN 500 MG/1
500 TABLET ORAL
COMMUNITY
End: 2023-09-07

## 2023-08-23 NOTE — PROGRESS NOTES
Chief Complaint:   Recurrent urinary tract infections    HPI:   Patient is a 52-year-old female that has been followed by this clinic for recurrent E coli cystitis.  Was recently seen by urgent care for possible positive urine culture, and empirically treated with Cipro.  Patient is presenting as a follow-up.  Urine in clinic is negative.  Patient has recurrent E coli cystitis despite prophylactic nightly antibiotics.  Patient has chronic constipation that requires bowel prep for elimination.  Is followed by GI.  Has an upcoming appointment with infectious disease.  07/13/2023  Patient has been followed by this clinic for recurrent E coli cystitis.  Was recently prescribed nightly antibiotics, Macrobid, for 3 months.  Unfortunately, patient reports dysuria and pelvic pain for 2 days.  Urine in clinic indicates nitrates leukocytes and trace blood, all other parameters are negative.  Denies gross hematuria or fever.  Patient has a long history of chronic constipation, defecating every 10-12 days, requiring bowel prep for elimination.  Is followed by GI.  06/23/2023  Patient is a 52-year-old female that is presenting as a follow-up to recurrent urinary tract infections.  Patient recently was treated for E coli cystitis.  Urine in clinic is negative and PVR is 13 mL.  Patient is currently asymptomatic.  Had a recent CT abdomen pelvis that was negative for renal abnormalities.  Patient states that she has severe constipation and only defecates every 10-12 days.  Reports that E coli UTIs are associated with her bowel prep for defecation.  03/14/2023  Patient is a 52-year-old female that is presenting with a history of recurrent urinary tract infections and was recently seen in ED for pyelonephritis.  Patient states that she was prescribed Cipro, twice, and is currently asymptomatic.  Urine in clinic is negative and PVR is 0 mL.  No history of gross hematuria or renal stones.  Patient states that she has chronic  constipation, only has 2-3 bowel movements a month.  Reports that urine cultures always indicate E coli.  Allergies:  Patient has no known allergies.    Medications:  has a current medication list which includes the following prescription(s): amitriptyline, ciprofloxacin hcl, clonazepam, escitalopram oxalate, levothyroxine, meth/meblue/sod phos/psal/hyos, nitrofurantoin (macrocrystal-monohydrate), quetiapine, vyvanse, and valacyclovir.    Review of Systems:  General: No fever, chills, fatigability, or weight loss.  Skin: No rashes, itching, or changes in color or texture of skin.  Chest: Denies QUINN, cyanosis, wheezing, cough, and sputum production.  Abdomen: Appetite fine. No weight loss. Denies diarrhea, abdominal pain, hematemesis, or blood in stool.  Musculoskeletal: No joint stiffness or swelling. Denies back pain.  : As above.  All other review of systems negative.    PMH:   has a past medical history of ADD (attention deficit disorder), Hypothyroidism, Irritable bowel syndrome (IBS), Migraine, Post Traumatic Stress Disorder, and Therapeutic Drug Monitoring.    PSH:   has a past surgical history that includes  section; Knee surgery (Left); Appendectomy; Breast surgery; Hysterectomy (); Inguinal hernia repair (Left, 2017); Colonoscopy (2019); and Augmentation of breast.    FamHx: family history includes Breast cancer in her paternal aunt, paternal aunt and another family member; COPD in her maternal grandmother; Cancer in her maternal grandfather and sister; Colon cancer in her mother; Diabetes in her mother and paternal grandmother; Diverticulitis in her paternal grandmother; Emphysema in her paternal grandfather; Hypothyroidism in her maternal grandmother; Parkinsonism in her paternal grandmother; Prostate cancer in her father and paternal grandfather; Rheum arthritis in her maternal grandmother.    SocHx:  reports that she has never smoked. She has never used smokeless tobacco. She  reports that she does not drink alcohol and does not use drugs.      Physical Exam:  Vitals:    08/23/23 1407   BP: 123/78     General: A&Ox3, no apparent distress, no deformities  Neck: No masses, normal thyroid  Lungs: normal inspiration, no use of accessory muscles  Heart: normal pulse, no arrhythmias  Abdomen: Soft, NT, ND, no masses, no hernias, no hepatosplenomegaly  Labs/Studies:     Impression/Plan:   Recurrent E coli cystitis with a history of pyelonephritis  Patient has not had resolution with nightly antibiotics, therefore, referral for infectious Disease was placed.  Patient to hold Macrobid and Cipro was prescribed per urgent care . Has follow-up ID appointment.

## 2023-08-24 ENCOUNTER — PATIENT MESSAGE (OUTPATIENT)
Dept: UROLOGY | Facility: CLINIC | Age: 53
End: 2023-08-24
Payer: COMMERCIAL

## 2023-09-05 ENCOUNTER — OFFICE VISIT (OUTPATIENT)
Dept: UROLOGY | Facility: CLINIC | Age: 53
End: 2023-09-05
Payer: COMMERCIAL

## 2023-09-05 VITALS
RESPIRATION RATE: 18 BRPM | DIASTOLIC BLOOD PRESSURE: 82 MMHG | WEIGHT: 128 LBS | BODY MASS INDEX: 18.9 KG/M2 | HEART RATE: 104 BPM | SYSTOLIC BLOOD PRESSURE: 127 MMHG

## 2023-09-05 DIAGNOSIS — N39.0 RECURRENT UTI: Primary | ICD-10-CM

## 2023-09-05 PROCEDURE — 1159F PR MEDICATION LIST DOCUMENTED IN MEDICAL RECORD: ICD-10-PCS | Mod: CPTII,S$GLB,, | Performed by: NURSE PRACTITIONER

## 2023-09-05 PROCEDURE — 3079F DIAST BP 80-89 MM HG: CPT | Mod: CPTII,S$GLB,, | Performed by: NURSE PRACTITIONER

## 2023-09-05 PROCEDURE — 99999 PR PBB SHADOW E&M-EST. PATIENT-LVL III: CPT | Mod: PBBFAC,,, | Performed by: NURSE PRACTITIONER

## 2023-09-05 PROCEDURE — 3079F PR MOST RECENT DIASTOLIC BLOOD PRESSURE 80-89 MM HG: ICD-10-PCS | Mod: CPTII,S$GLB,, | Performed by: NURSE PRACTITIONER

## 2023-09-05 PROCEDURE — 87186 SC STD MICRODIL/AGAR DIL: CPT | Performed by: NURSE PRACTITIONER

## 2023-09-05 PROCEDURE — 3074F SYST BP LT 130 MM HG: CPT | Mod: CPTII,S$GLB,, | Performed by: NURSE PRACTITIONER

## 2023-09-05 PROCEDURE — 3008F PR BODY MASS INDEX (BMI) DOCUMENTED: ICD-10-PCS | Mod: CPTII,S$GLB,, | Performed by: NURSE PRACTITIONER

## 2023-09-05 PROCEDURE — 99999 PR PBB SHADOW E&M-EST. PATIENT-LVL III: ICD-10-PCS | Mod: PBBFAC,,, | Performed by: NURSE PRACTITIONER

## 2023-09-05 PROCEDURE — 3074F PR MOST RECENT SYSTOLIC BLOOD PRESSURE < 130 MM HG: ICD-10-PCS | Mod: CPTII,S$GLB,, | Performed by: NURSE PRACTITIONER

## 2023-09-05 PROCEDURE — 87077 CULTURE AEROBIC IDENTIFY: CPT | Performed by: NURSE PRACTITIONER

## 2023-09-05 PROCEDURE — 87086 URINE CULTURE/COLONY COUNT: CPT | Performed by: NURSE PRACTITIONER

## 2023-09-05 PROCEDURE — 3008F BODY MASS INDEX DOCD: CPT | Mod: CPTII,S$GLB,, | Performed by: NURSE PRACTITIONER

## 2023-09-05 PROCEDURE — 99214 OFFICE O/P EST MOD 30 MIN: CPT | Mod: S$GLB,,, | Performed by: NURSE PRACTITIONER

## 2023-09-05 PROCEDURE — 99214 PR OFFICE/OUTPT VISIT, EST, LEVL IV, 30-39 MIN: ICD-10-PCS | Mod: S$GLB,,, | Performed by: NURSE PRACTITIONER

## 2023-09-05 PROCEDURE — 1159F MED LIST DOCD IN RCRD: CPT | Mod: CPTII,S$GLB,, | Performed by: NURSE PRACTITIONER

## 2023-09-05 PROCEDURE — 87088 URINE BACTERIA CULTURE: CPT | Performed by: NURSE PRACTITIONER

## 2023-09-05 RX ORDER — PHENAZOPYRIDINE HYDROCHLORIDE 200 MG/1
200 TABLET, FILM COATED ORAL 3 TIMES DAILY PRN
Qty: 15 TABLET | Refills: 0 | Status: SHIPPED | OUTPATIENT
Start: 2023-09-05 | End: 2023-09-11

## 2023-09-05 NOTE — PROGRESS NOTES
Chief Complaint:   Recurrent urinary tract infections    HPI:   Patient is familiar to our clinic secondary to recurrent urinary tract infections.  Patient recently completed antibiotics secondary to Klebsiella positive urine culture.  Patient states that over the last 3 days she is had slight dysuria.  Denies pelvic or flank pain.  Denies gross hematuria or fever.  Urine in clinic indicates leukocytes and nitrates, all other parameters are negative.  Patient has an upcoming appointment with Infectious Disease secondary to 9 positive urine cultures in the past 9 months despite nightly antibiotics.  08/23/2023  Patient is a 52-year-old female that has been followed by this clinic for recurrent E coli cystitis.  Was recently seen by urgent care for possible positive urine culture, and empirically treated with Cipro.  Patient is presenting as a follow-up.  Urine in clinic is negative.  Patient has recurrent E coli cystitis despite prophylactic nightly antibiotics.  Patient has chronic constipation that requires bowel prep for elimination.  Is followed by GI.  Has an upcoming appointment with infectious disease.  07/13/2023  Patient has been followed by this clinic for recurrent E coli cystitis.  Was recently prescribed nightly antibiotics, Macrobid, for 3 months.  Unfortunately, patient reports dysuria and pelvic pain for 2 days.  Urine in clinic indicates nitrates leukocytes and trace blood, all other parameters are negative.  Denies gross hematuria or fever.  Patient has a long history of chronic constipation, defecating every 10-12 days, requiring bowel prep for elimination.  Is followed by GI.  06/23/2023  Patient is a 52-year-old female that is presenting as a follow-up to recurrent urinary tract infections.  Patient recently was treated for E coli cystitis.  Urine in clinic is negative and PVR is 13 mL.  Patient is currently asymptomatic.  Had a recent CT abdomen pelvis that was negative for renal abnormalities.   Patient states that she has severe constipation and only defecates every 10-12 days.  Reports that E coli UTIs are associated with her bowel prep for defecation.  2023  Patient is a 52-year-old female that is presenting with a history of recurrent urinary tract infections and was recently seen in ED for pyelonephritis.  Patient states that she was prescribed Cipro, twice, and is currently asymptomatic.  Urine in clinic is negative and PVR is 0 mL.  No history of gross hematuria or renal stones.  Patient states that she has chronic constipation, only has 2-3 bowel movements a month.  Reports that urine cultures always indicate E coli.  Allergies:  Patient has no known allergies.    Medications:  has a current medication list which includes the following prescription(s): amitriptyline, ciprofloxacin hcl, clonazepam, escitalopram oxalate, levothyroxine, meth/meblue/sod phos/psal/hyos, nitrofurantoin (macrocrystal-monohydrate), quetiapine, vyvanse, phenazopyridine, and valacyclovir.    Review of Systems:  General: No fever, chills, fatigability, or weight loss.  Skin: No rashes, itching, or changes in color or texture of skin.  Chest: Denies QUINN, cyanosis, wheezing, cough, and sputum production.  Abdomen: Appetite fine. No weight loss. Denies diarrhea, abdominal pain, hematemesis, or blood in stool.  Musculoskeletal: No joint stiffness or swelling. Denies back pain.  : As above.  All other review of systems negative.    PMH:   has a past medical history of ADD (attention deficit disorder), Hypothyroidism, Irritable bowel syndrome (IBS), Migraine, Post Traumatic Stress Disorder, and Therapeutic Drug Monitoring.    PSH:   has a past surgical history that includes  section; Knee surgery (Left); Appendectomy; Breast surgery; Hysterectomy (); Inguinal hernia repair (Left, 2017); Colonoscopy (2019); and Augmentation of breast.    FamHx: family history includes Breast cancer in her paternal  aunt, paternal aunt and another family member; COPD in her maternal grandmother; Cancer in her maternal grandfather and sister; Colon cancer in her mother; Diabetes in her mother and paternal grandmother; Diverticulitis in her paternal grandmother; Emphysema in her paternal grandfather; Hypothyroidism in her maternal grandmother; Parkinsonism in her paternal grandmother; Prostate cancer in her father and paternal grandfather; Rheum arthritis in her maternal grandmother.    SocHx:  reports that she has never smoked. She has never used smokeless tobacco. She reports that she does not drink alcohol and does not use drugs.      Physical Exam:  Vitals:    09/05/23 1453   BP: 127/82   Pulse: 104   Resp: 18     General: A&Ox3, no apparent distress, no deformities  Neck: No masses, normal thyroid  Lungs: normal inspiration, no use of accessory muscles  Heart: normal pulse, no arrhythmias  Abdomen: Soft, NT, ND, no masses, no hernias, no hepatosplenomegaly  Lymphatic: Neck and groin nodes negative  Skin: The skin is warm and dry. No jaundice.    Labs/Studies:   See HPI    Impression/Plan:   Recurrent E coli cystitis with a history of pyelonephritis  Patient has not had resolution with nightly antibiotics, therefore, referral for infectious Disease was placed.Urine cx sent.

## 2023-09-08 ENCOUNTER — PATIENT MESSAGE (OUTPATIENT)
Dept: UROLOGY | Facility: CLINIC | Age: 53
End: 2023-09-08
Payer: COMMERCIAL

## 2023-09-08 ENCOUNTER — TELEPHONE (OUTPATIENT)
Dept: UROLOGY | Facility: CLINIC | Age: 53
End: 2023-09-08
Payer: COMMERCIAL

## 2023-09-08 LAB — BACTERIA UR CULT: ABNORMAL

## 2023-09-08 RX ORDER — SULFAMETHOXAZOLE AND TRIMETHOPRIM 800; 160 MG/1; MG/1
1 TABLET ORAL 2 TIMES DAILY
Qty: 20 TABLET | Refills: 0 | Status: SHIPPED | OUTPATIENT
Start: 2023-09-08 | End: 2023-09-18

## 2023-09-11 ENCOUNTER — OFFICE VISIT (OUTPATIENT)
Dept: INFECTIOUS DISEASES | Facility: CLINIC | Age: 53
End: 2023-09-11
Payer: COMMERCIAL

## 2023-09-11 VITALS
DIASTOLIC BLOOD PRESSURE: 88 MMHG | WEIGHT: 128.5 LBS | BODY MASS INDEX: 19.03 KG/M2 | HEIGHT: 69 IN | HEART RATE: 100 BPM | SYSTOLIC BLOOD PRESSURE: 122 MMHG

## 2023-09-11 DIAGNOSIS — K58.1 IRRITABLE BOWEL SYNDROME WITH CONSTIPATION: ICD-10-CM

## 2023-09-11 DIAGNOSIS — N39.0 RECURRENT UTI: ICD-10-CM

## 2023-09-11 PROCEDURE — 99999 PR PBB SHADOW E&M-EST. PATIENT-LVL IV: ICD-10-PCS | Mod: PBBFAC,,, | Performed by: STUDENT IN AN ORGANIZED HEALTH CARE EDUCATION/TRAINING PROGRAM

## 2023-09-11 PROCEDURE — 3008F BODY MASS INDEX DOCD: CPT | Mod: CPTII,S$GLB,, | Performed by: STUDENT IN AN ORGANIZED HEALTH CARE EDUCATION/TRAINING PROGRAM

## 2023-09-11 PROCEDURE — 99204 PR OFFICE/OUTPT VISIT, NEW, LEVL IV, 45-59 MIN: ICD-10-PCS | Mod: S$GLB,,, | Performed by: STUDENT IN AN ORGANIZED HEALTH CARE EDUCATION/TRAINING PROGRAM

## 2023-09-11 PROCEDURE — 3079F PR MOST RECENT DIASTOLIC BLOOD PRESSURE 80-89 MM HG: ICD-10-PCS | Mod: CPTII,S$GLB,, | Performed by: STUDENT IN AN ORGANIZED HEALTH CARE EDUCATION/TRAINING PROGRAM

## 2023-09-11 PROCEDURE — 3074F PR MOST RECENT SYSTOLIC BLOOD PRESSURE < 130 MM HG: ICD-10-PCS | Mod: CPTII,S$GLB,, | Performed by: STUDENT IN AN ORGANIZED HEALTH CARE EDUCATION/TRAINING PROGRAM

## 2023-09-11 PROCEDURE — 1159F PR MEDICATION LIST DOCUMENTED IN MEDICAL RECORD: ICD-10-PCS | Mod: CPTII,S$GLB,, | Performed by: STUDENT IN AN ORGANIZED HEALTH CARE EDUCATION/TRAINING PROGRAM

## 2023-09-11 PROCEDURE — 99999 PR PBB SHADOW E&M-EST. PATIENT-LVL IV: CPT | Mod: PBBFAC,,, | Performed by: STUDENT IN AN ORGANIZED HEALTH CARE EDUCATION/TRAINING PROGRAM

## 2023-09-11 PROCEDURE — 99204 OFFICE O/P NEW MOD 45 MIN: CPT | Mod: S$GLB,,, | Performed by: STUDENT IN AN ORGANIZED HEALTH CARE EDUCATION/TRAINING PROGRAM

## 2023-09-11 PROCEDURE — 1159F MED LIST DOCD IN RCRD: CPT | Mod: CPTII,S$GLB,, | Performed by: STUDENT IN AN ORGANIZED HEALTH CARE EDUCATION/TRAINING PROGRAM

## 2023-09-11 PROCEDURE — 3074F SYST BP LT 130 MM HG: CPT | Mod: CPTII,S$GLB,, | Performed by: STUDENT IN AN ORGANIZED HEALTH CARE EDUCATION/TRAINING PROGRAM

## 2023-09-11 PROCEDURE — 3008F PR BODY MASS INDEX (BMI) DOCUMENTED: ICD-10-PCS | Mod: CPTII,S$GLB,, | Performed by: STUDENT IN AN ORGANIZED HEALTH CARE EDUCATION/TRAINING PROGRAM

## 2023-09-11 PROCEDURE — 3079F DIAST BP 80-89 MM HG: CPT | Mod: CPTII,S$GLB,, | Performed by: STUDENT IN AN ORGANIZED HEALTH CARE EDUCATION/TRAINING PROGRAM

## 2023-09-11 RX ORDER — METHENAMINE HIPPURATE 1000 MG/1
1 TABLET ORAL 2 TIMES DAILY
Qty: 60 TABLET | Refills: 0 | Status: SHIPPED | OUTPATIENT
Start: 2023-09-11 | End: 2023-10-05 | Stop reason: SDUPTHER

## 2023-09-11 NOTE — ASSESSMENT & PLAN NOTE
It is imperative patient have regular bowel movements to prevent recurrent UTI. On laxative currently. Has colonoscopy follow up with GI.

## 2023-09-11 NOTE — ASSESSMENT & PLAN NOTE
--Concerned given ongoing symptomatic UTI and CVA tenderness on left   --Agree with Bactrim for now as this is good agent for pyelonephritis in outpatient setting and patient not exhibiting signs of critical illness/sepsis at this time   --Will send script for methenamine BID to pharmacy   --Recommended patient  vitamin C at pharmacy to take daily for prevention   --Will ask urology to perform cystoscopy to assess for anatomic abnormalities   --Scheduling renal US to look for hydronephrosis/abscess   --Follow up in 2 weeks

## 2023-09-11 NOTE — PROGRESS NOTES
Infectious Disease Clinic Note    Patient Name: Tamiko Bettencourt  YOB: 1970    PRESENTING HISTORY       History of Present Illness:  Ms. Tamiko Bettencourt is a 52 y.o. female w/ significant PMHx of here with recurrent UTI. Reports in February 2023 had really bad ovarian pain. Diagnosed with UTI that had spread to kidneys[place] [facility] Was close to being [place]. Has since had at least 6 positive urine cultures. Has seen urology outpatient and treated as well. Was treated with ciprofloxacin for E coli and then Klebsiella now being treated with Bactrim. Tried Macrobid suppression but UTI still broke through. Minimal resolution so far after few days of Bactrim. Had stress incontinence that was treated with mesh/sling previously. No longer incontinent. Does experience bad constipation relieved with Dulcolax. UTI symptoms include bladder spasms, painful postvoid, cloudiness, malodor, low grade fever, and fatigue. Also has an ongoing discomfort along left side of her lower back. No high grade fever or chills. Discussed a number of OTC and prescription medications to help prevent UTI. Interested in trying methenamine and vitamin C. Would also like urology to perform cystoscopy. Discussed in and out catheterized urinalysis if symptoms persist. Will continue Bactrim for now but may need to consider IV therapy if symptoms worsen.     Review of Systems:  Constitutional: no fever or chills  Eyes: no visual changes  ENT: no nasal congestion or sore throat  Respiratory: no cough or shortness of breath  Cardiovascular: no chest pain  Gastrointestinal: constipation   Genitourinary: dysuria, left flank pain, bladder spasms   Musculoskeletal: no arthralgias or myalgias  Skin: no rash  Neurological: no headaches, numbness, or paresthesias    The following portions of the patient's history were reviewed and updated as appropriate: allergies, current medications, past family history, past medical history, past  social history, past surgical history, and problem list.    PAST HISTORY:     Immunization History   Administered Date(s) Administered    COVID-19, MRNA, LN-S, PF (MODERNA FULL 0.5 ML DOSE) 2021, 2021    Influenza 10/15/2019, 2020    Influenza - Quadrivalent 2017, 10/15/2019    MMR 2020    PPD Test 2019    Tdap 2017, 2020       Past Medical History:   Diagnosis Date    ADD (attention deficit disorder)     Hypothyroidism     Irritable bowel syndrome (IBS)     Migraine     Post Traumatic Stress Disorder     Her Son Was A Marine Who Was Killed While Serving In [email or web address]    Therapeutic Drug Monitoring        Past Surgical History:   Procedure Laterality Date    APPENDECTOMY      AUGMENTATION OF BREAST      BREAST SURGERY      Augmentation      SECTION      COLONOSCOPY  2019    Dr. Wong    HYSTERECTOMY  2010    partial    INGUINAL HERNIA REPAIR Left 2017    KNEE SURGERY Left        Family History   Problem Relation Age of Onset    Diabetes Mother     Colon cancer Mother     Prostate cancer Father     Cancer Sister     Breast cancer Paternal Aunt         age unknown    Breast cancer Paternal Aunt         age unknown    COPD Maternal Grandmother     Rheum arthritis Maternal Grandmother     Hypothyroidism Maternal Grandmother     Cancer Maternal Grandfather     Diabetes Paternal Grandmother     Diverticulitis Paternal Grandmother     Parkinsonism Paternal Grandmother     Prostate cancer Paternal Grandfather     Emphysema Paternal Grandfather         non-smoker     Breast cancer Other         age unknown       Social History     Socioeconomic History    Marital status:    Tobacco Use    Smoking status: Never    Smokeless tobacco: Never   Substance and Sexual Activity    Alcohol use: No    Drug use: No    Sexual activity: Yes     Partners: Male   Social History Narrative    ** Merged History Encounter **            MEDICATIONS & ALLERGIES:  "    Current Outpatient Medications on File Prior to Visit   Medication Sig    amitriptyline (ELAVIL) 50 MG tablet Take 1 tablet (50 mg total) by mouth every evening.    clonazePAM (KLONOPIN) 1 MG tablet TAKE ONE TABLET BY MOUTH THREE TIMES DAILY    EScitalopram oxalate (LEXAPRO) 20 MG tablet TAKE 2 TABLETS BY MOUTH DAILY    levothyroxine (SYNTHROID) 112 MCG tablet TAKE ONE TABLET BY MOUTH ONCE DAILY.    phenazopyridine (PYRIDIUM) 200 MG tablet Take 1 tablet (200 mg total) by mouth 3 (three) times daily as needed for Pain.    QUEtiapine (SEROQUEL) 100 MG Tab TAKE TWO TABLETS BY MOUTH EVERY EVENING    sulfamethoxazole-trimethoprim 800-160mg (BACTRIM DS) 800-160 mg Tab Take 1 tablet by mouth 2 (two) times daily. for 10 days    valACYclovir (VALTREX) 1000 MG tablet Take 1 tablet (1,000 mg total) by mouth once daily. (Patient taking differently: Take 1,000 mg by mouth once daily. As needed)    VYVANSE 50 mg capsule TAKE ONE CAPSULE BY MOUTH IN THE MORNING    promethazine (PHENERGAN) 25 MG tablet Take 25 mg by mouth every 6 (six) hours as needed.    [DISCONTINUED] meth/meblue/sod phos/psal/hyos (URIBEL ORAL) Take by mouth.     No current facility-administered medications on file prior to visit.       Review of patient's allergies indicates:  No Known Allergies    OBJECTIVE:   Vital Signs:  Vitals:    09/11/23 1448   BP: 122/88   Pulse: 100   Weight: 58.3 kg (128 lb 8.5 oz)   Height: 5' 9" (1.753 m)       No results found for this or any previous visit (from the past 24 hour(s)).      Physical Exam:   General:  Well developed, well nourished, no acute distress  HEENT:  Normocephalic, atraumatic  CVS:  RRR, S1 and S2 normal, no murmurs, rubs, gallops  Resp:  Lungs clear to auscultation, no wheezes, rales, rhonchi  GI:  Abdomen soft, non-tender, non-distended, normoactive bowel sounds, no masses  MSK:  No muscle atrophy, peripheral edema, full range of motion; left CVA tenderness   Skin:  No rashes, ulcers, erythema  Psych:  " Alert and oriented to person, place, and time    ASSESSMENT:     Recurrent UTI  --Concerned given ongoing symptomatic UTI and CVA tenderness on left   --Agree with Bactrim for now as this is good agent for pyelonephritis in outpatient setting and patient not exhibiting signs of critical illness/sepsis at this time   --Will send script for methenamine BID to pharmacy   --Recommended patient  vitamin C at pharmacy to take daily for prevention   --Will ask urology to perform cystoscopy to assess for anatomic abnormalities   --Scheduling renal US to look for hydronephrosis/abscess   --Recommend in and out catheterization for next urinalysis   --May need to discuss OPAT if symptoms worsen after current PO course   --Follow up in 2 weeks     Irritable bowel syndrome with constipation  It is imperative patient have regular bowel movements to prevent recurrent UTI. On laxative currently. Has colonoscopy follow up with GI.       PLAN:     Tamiko was seen today for urinary tract infection.    Diagnoses and all orders for this visit:    Recurrent UTI  -     Ambulatory referral/consult to Infectious Disease  -     US Kidney; Future    Irritable bowel syndrome with constipation    Other orders  -     methenamine (HIPREX) 1 gram Tab; Take 1 tablet (1 g total) by mouth 2 (two) times daily.      The total time for evaluation and management services performed on 9/11/23 was greater than 30 minutes.        Beto Bourgeois, DO   Infectious Diseases

## 2023-09-22 ENCOUNTER — APPOINTMENT (OUTPATIENT)
Dept: RADIOLOGY | Facility: HOSPITAL | Age: 53
End: 2023-09-22
Attending: STUDENT IN AN ORGANIZED HEALTH CARE EDUCATION/TRAINING PROGRAM
Payer: COMMERCIAL

## 2023-09-22 DIAGNOSIS — N39.0 RECURRENT UTI: ICD-10-CM

## 2023-09-22 PROCEDURE — 76770 US RETROPERITONEAL COMPLETE: ICD-10-PCS | Mod: 26,,, | Performed by: RADIOLOGY

## 2023-09-22 PROCEDURE — 76770 US EXAM ABDO BACK WALL COMP: CPT | Mod: TC,PO

## 2023-09-22 PROCEDURE — 76770 US EXAM ABDO BACK WALL COMP: CPT | Mod: 26,,, | Performed by: RADIOLOGY

## 2023-09-25 ENCOUNTER — PROCEDURE VISIT (OUTPATIENT)
Dept: UROLOGY | Facility: CLINIC | Age: 53
End: 2023-09-25
Payer: COMMERCIAL

## 2023-09-25 VITALS
HEIGHT: 69 IN | SYSTOLIC BLOOD PRESSURE: 120 MMHG | DIASTOLIC BLOOD PRESSURE: 83 MMHG | BODY MASS INDEX: 18.96 KG/M2 | HEART RATE: 76 BPM | TEMPERATURE: 98 F | WEIGHT: 128 LBS | RESPIRATION RATE: 16 BRPM

## 2023-09-25 DIAGNOSIS — N39.0 RECURRENT UTI: Primary | ICD-10-CM

## 2023-09-25 LAB
BILIRUB UR QL STRIP: NEGATIVE
GLUCOSE UR QL STRIP: NEGATIVE
KETONES UR QL STRIP: NEGATIVE
LEUKOCYTE ESTERASE UR QL STRIP: NEGATIVE
PH, POC UA: 6
POC BLOOD, URINE: NEGATIVE
POC NITRATES, URINE: NEGATIVE
PROT UR QL STRIP: NEGATIVE
SP GR UR STRIP: 1.02 (ref 1–1.03)
UROBILINOGEN UR STRIP-ACNC: 0.2 (ref 0.1–1.1)

## 2023-09-25 PROCEDURE — 52000 CYSTOURETHROSCOPY: CPT | Mod: S$GLB,,, | Performed by: UROLOGY

## 2023-09-25 PROCEDURE — 81003 URINALYSIS AUTO W/O SCOPE: CPT | Mod: QW,S$GLB,, | Performed by: UROLOGY

## 2023-09-25 PROCEDURE — 81003 POCT URINALYSIS, DIPSTICK, AUTOMATED, W/O SCOPE: ICD-10-PCS | Mod: QW,S$GLB,, | Performed by: UROLOGY

## 2023-09-25 PROCEDURE — 52000 CYSTOSCOPY: ICD-10-PCS | Mod: S$GLB,,, | Performed by: UROLOGY

## 2023-09-25 NOTE — PROCEDURES
Cystoscopy    Date/Time: 9/25/2023 10:30 AM    Performed by: Giancarlo Cochran MD  Authorized by: Giancarlo Cochran MD    Consent Done?:  Yes (Written)  Anesthesia:  Intraurethral instillation  Local anesthetic:  Lidocaine 2% topical gel  Indications: recurrent UTIs    Anesthesia:  Intraurethral instillation  Scope type:  Flexible cystoscope  Comments:      After informed consent, and preoperative antibiotic positioned in supine position.  Genitalia prepped and draped sterilely.  A 17 A[place]i flexible cystoscope was passed through the urethra into the bladder.  Systematic examination was performed Bilateral ureteral orifices were seen.  No urothelial lesion was seen. The scope was removed.  She tolerated procedure well.

## 2023-10-05 ENCOUNTER — OFFICE VISIT (OUTPATIENT)
Dept: INFECTIOUS DISEASES | Facility: CLINIC | Age: 53
End: 2023-10-05
Payer: COMMERCIAL

## 2023-10-05 ENCOUNTER — LAB VISIT (OUTPATIENT)
Dept: LAB | Facility: HOSPITAL | Age: 53
End: 2023-10-05
Attending: STUDENT IN AN ORGANIZED HEALTH CARE EDUCATION/TRAINING PROGRAM
Payer: COMMERCIAL

## 2023-10-05 VITALS
HEIGHT: 69 IN | WEIGHT: 130.31 LBS | SYSTOLIC BLOOD PRESSURE: 116 MMHG | BODY MASS INDEX: 19.3 KG/M2 | DIASTOLIC BLOOD PRESSURE: 80 MMHG

## 2023-10-05 DIAGNOSIS — N39.0 RECURRENT UTI: ICD-10-CM

## 2023-10-05 DIAGNOSIS — K58.1 IRRITABLE BOWEL SYNDROME WITH CONSTIPATION: ICD-10-CM

## 2023-10-05 LAB
ANION GAP SERPL CALC-SCNC: 7 MMOL/L (ref 8–16)
BUN SERPL-MCNC: 10 MG/DL (ref 6–20)
CALCIUM SERPL-MCNC: 9.7 MG/DL (ref 8.7–10.5)
CHLORIDE SERPL-SCNC: 102 MMOL/L (ref 95–110)
CO2 SERPL-SCNC: 28 MMOL/L (ref 23–29)
CREAT SERPL-MCNC: 0.8 MG/DL (ref 0.5–1.4)
EST. GFR  (NO RACE VARIABLE): >60 ML/MIN/1.73 M^2
GLUCOSE SERPL-MCNC: 96 MG/DL (ref 70–110)
POTASSIUM SERPL-SCNC: 4 MMOL/L (ref 3.5–5.1)
SODIUM SERPL-SCNC: 137 MMOL/L (ref 136–145)

## 2023-10-05 PROCEDURE — 99999 PR PBB SHADOW E&M-EST. PATIENT-LVL III: ICD-10-PCS | Mod: PBBFAC,,, | Performed by: STUDENT IN AN ORGANIZED HEALTH CARE EDUCATION/TRAINING PROGRAM

## 2023-10-05 PROCEDURE — 3079F DIAST BP 80-89 MM HG: CPT | Mod: CPTII,S$GLB,, | Performed by: STUDENT IN AN ORGANIZED HEALTH CARE EDUCATION/TRAINING PROGRAM

## 2023-10-05 PROCEDURE — 99214 OFFICE O/P EST MOD 30 MIN: CPT | Mod: S$GLB,,, | Performed by: STUDENT IN AN ORGANIZED HEALTH CARE EDUCATION/TRAINING PROGRAM

## 2023-10-05 PROCEDURE — 3008F BODY MASS INDEX DOCD: CPT | Mod: CPTII,S$GLB,, | Performed by: STUDENT IN AN ORGANIZED HEALTH CARE EDUCATION/TRAINING PROGRAM

## 2023-10-05 PROCEDURE — 36415 COLL VENOUS BLD VENIPUNCTURE: CPT | Performed by: STUDENT IN AN ORGANIZED HEALTH CARE EDUCATION/TRAINING PROGRAM

## 2023-10-05 PROCEDURE — 3074F PR MOST RECENT SYSTOLIC BLOOD PRESSURE < 130 MM HG: ICD-10-PCS | Mod: CPTII,S$GLB,, | Performed by: STUDENT IN AN ORGANIZED HEALTH CARE EDUCATION/TRAINING PROGRAM

## 2023-10-05 PROCEDURE — 99214 PR OFFICE/OUTPT VISIT, EST, LEVL IV, 30-39 MIN: ICD-10-PCS | Mod: S$GLB,,, | Performed by: STUDENT IN AN ORGANIZED HEALTH CARE EDUCATION/TRAINING PROGRAM

## 2023-10-05 PROCEDURE — 3079F PR MOST RECENT DIASTOLIC BLOOD PRESSURE 80-89 MM HG: ICD-10-PCS | Mod: CPTII,S$GLB,, | Performed by: STUDENT IN AN ORGANIZED HEALTH CARE EDUCATION/TRAINING PROGRAM

## 2023-10-05 PROCEDURE — 3008F PR BODY MASS INDEX (BMI) DOCUMENTED: ICD-10-PCS | Mod: CPTII,S$GLB,, | Performed by: STUDENT IN AN ORGANIZED HEALTH CARE EDUCATION/TRAINING PROGRAM

## 2023-10-05 PROCEDURE — 1159F MED LIST DOCD IN RCRD: CPT | Mod: CPTII,S$GLB,, | Performed by: STUDENT IN AN ORGANIZED HEALTH CARE EDUCATION/TRAINING PROGRAM

## 2023-10-05 PROCEDURE — 99999 PR PBB SHADOW E&M-EST. PATIENT-LVL III: CPT | Mod: PBBFAC,,, | Performed by: STUDENT IN AN ORGANIZED HEALTH CARE EDUCATION/TRAINING PROGRAM

## 2023-10-05 PROCEDURE — 1159F PR MEDICATION LIST DOCUMENTED IN MEDICAL RECORD: ICD-10-PCS | Mod: CPTII,S$GLB,, | Performed by: STUDENT IN AN ORGANIZED HEALTH CARE EDUCATION/TRAINING PROGRAM

## 2023-10-05 PROCEDURE — 3074F SYST BP LT 130 MM HG: CPT | Mod: CPTII,S$GLB,, | Performed by: STUDENT IN AN ORGANIZED HEALTH CARE EDUCATION/TRAINING PROGRAM

## 2023-10-05 PROCEDURE — 80048 BASIC METABOLIC PNL TOTAL CA: CPT | Performed by: STUDENT IN AN ORGANIZED HEALTH CARE EDUCATION/TRAINING PROGRAM

## 2023-10-05 RX ORDER — ESTRADIOL 0.1 MG/G
4 CREAM VAGINAL DAILY
Qty: 120 G | Refills: 3 | Status: SHIPPED | OUTPATIENT
Start: 2023-10-05 | End: 2023-12-26 | Stop reason: SDUPTHER

## 2023-10-05 RX ORDER — METHENAMINE HIPPURATE 1000 MG/1
1 TABLET ORAL 2 TIMES DAILY
Qty: 60 TABLET | Refills: 0 | Status: SHIPPED | OUTPATIENT
Start: 2023-10-05 | End: 2023-11-04

## 2023-10-05 NOTE — ASSESSMENT & PLAN NOTE
--Concerned given ongoing symptomatic UTI and CVA tenderness on left   --Completed 10 day course of Bactrim   --Continue methenamine BID and vitamin C  --Normal cystoscopy on 9/25 and retroperitoneal US on 9/22  --Recommend in and out catheterization for next urinalysis   --May need to discuss OPAT if symptoms worsen   --Follow up with ID as needed

## 2023-10-05 NOTE — PROGRESS NOTES
Infectious Disease Clinic Note    Patient Name: Tamiko Bettencourt  YOB: 1970    PRESENTING HISTORY       History of Present Illness:  Ms. Tamiko Bettencourt is a 53 y.o. female w/ significant PMHx of IBS, hypothyroid, and PTSD initially seen as referral for recurrent UTI. Reported in February 2023 had really bad ovarian pain. Diagnosed with UTI that had spread to kidneys[place] [facility] to being [place]. Has since had at least 6 positive urine cultures. Has seen urology outpatient and treated as well. Was treated with ciprofloxacin for E coli and then Klebsiella now being treated with Bactrim. Tried Macrobid suppression but UTI still broke through. Minimal resolution so far after few days of Bactrim. Had stress incontinence that was treated with mesh/sling previously. No longer incontinent. Does experience bad constipation relieved with Dulcolax. UTI symptoms include bladder spasms, painful postvoid, cloudiness, malodor, low grade fever, and fatigue. Also has an ongoing discomfort along left side of her lower back. No high grade fever or chills. Discussed a number of OTC and prescription medications to help prevent UTI. Interested in trying methenamine and vitamin C. Would also like urology to perform cystoscopy. Discussed in and out catheterized urinalysis if symptoms persist. Will continue Bactrim for now but may need to consider IV therapy if symptoms worsen.    Urology performed cystoscopy in clinic on 9/25/23. No urothelial lesion seen. Retroperitoneal US reviewed from 9/22 and was normal. Patient completed 10 day course of Bactrim on 9/18. Today she reports burning and cloudy urine the past few days. Constipated up to 2 weeks. Has been taking methenamine which has helped. Completed course of Pyridium. Will check Cr today due to methenamine use. If WNL can continue as ppx for now. Patient voiced understanding. Will see urology on 10/16 for in/out cath then follow up with ID the  following week.       Review of Systems:  Constitutional: no fever or chills  Eyes: no visual changes  ENT: no nasal congestion or sore throat  Respiratory: no cough or shortness of breath  Cardiovascular: no chest pain  Gastrointestinal: no nausea or vomiting, no abdominal pain, ongoing constipation   Genitourinary: dysuria and cloudy urine   Musculoskeletal: no arthralgias or myalgias  Skin: no rash  Neurological: no headaches, numbness, or paresthesias    The following portions of the patient's history were reviewed and updated as appropriate: allergies, current medications, past family history, past medical history, past social history, past surgical history, and problem list.    PAST HISTORY:     Immunization History   Administered Date(s) Administered    COVID-19, MRNA, LN-S, PF (MODERNA FULL 0.5 ML DOSE) 2021, 2021    Influenza 10/15/2019, 2020    Influenza - Quadrivalent 2017, 10/15/2019    MMR 2020    PPD Test 2019    Tdap 2017, 2020       Past Medical History:   Diagnosis Date    ADD (attention deficit disorder)     Hypothyroidism     Irritable bowel syndrome (IBS)     Migraine     Post Traumatic Stress Disorder     Her Son Was A Marine Who Was Killed While Serving In A [employer]    Therapeutic Drug Monitoring        Past Surgical History:   Procedure Laterality Date    APPENDECTOMY      AUGMENTATION OF BREAST      BREAST SURGERY      Augmentation      SECTION      COLONOSCOPY  2019    Dr. Wong    HYSTERECTOMY  2010    partial    INGUINAL HERNIA REPAIR Left 2017    KNEE SURGERY Left        Family History   Problem Relation Age of Onset    Diabetes Mother     Colon cancer Mother     Prostate cancer Father     Cancer Sister     Breast cancer Paternal Aunt         age unknown    Breast cancer Paternal Aunt         age unknown    COPD Maternal Grandmother     Rheum arthritis Maternal Grandmother     Hypothyroidism Maternal Grandmother      Cancer Maternal Grandfather     Diabetes Paternal Grandmother     Diverticulitis Paternal Grandmother     Parkinsonism Paternal Grandmother     Prostate cancer Paternal Grandfather     Emphysema Paternal Grandfather         non-smoker     Breast cancer Other         age unknown       Social History     Socioeconomic History    Marital status:    Tobacco Use    Smoking status: Never    Smokeless tobacco: Never   Substance and Sexual Activity    Alcohol use: No    Drug use: No    Sexual activity: Yes     Partners: Male   Social History Narrative    ** Merged History Encounter **            MEDICATIONS & ALLERGIES:     Current Outpatient Medications on File Prior to Visit   Medication Sig    amitriptyline (ELAVIL) 50 MG tablet Take 1 tablet (50 mg total) by mouth every evening.    clonazePAM (KLONOPIN) 1 MG tablet TAKE ONE TABLET BY MOUTH THREE TIMES DAILY    EScitalopram oxalate (LEXAPRO) 20 MG tablet TAKE TWO TABLETS BY MOUTH EVERY DAY    levothyroxine (SYNTHROID) 112 MCG tablet TAKE ONE TABLET BY MOUTH ONCE DAILY.    methenamine (HIPREX) 1 gram Tab Take 1 tablet (1 g total) by mouth 2 (two) times daily.    promethazine (PHENERGAN) 25 MG tablet Take 25 mg by mouth every 6 (six) hours as needed.    QUEtiapine (SEROQUEL) 100 MG Tab TAKE TWO TABLETS BY MOUTH EVERY EVENING    valACYclovir (VALTREX) 1000 MG tablet Take 1 tablet (1,000 mg total) by mouth once daily. (Patient taking differently: Take 1,000 mg by mouth once daily. As needed)    VYVANSE 50 mg capsule TAKE ONE CAPSULE BY MOUTH IN THE MORNING     No current facility-administered medications on file prior to visit.       Review of patient's allergies indicates:  No Known Allergies    OBJECTIVE:   Vital Signs:  There were no vitals filed for this visit.    No results found for this or any previous visit (from the past 24 hour(s)).      Physical Exam:   General:  Well developed, well nourished, no acute distress  HEENT:  Normocephalic, atraumatic  CVS:   RRR, S1 and S2 normal, no murmurs, rubs, gallops  Resp:  Lungs clear to auscultation, no wheezes, rales, rhonchi  GI:  Abdomen soft, non-tender, non-distended, normoactive bowel sounds, no masses  MSK:  No muscle atrophy, peripheral edema, full range of motion  Skin:  No rashes, ulcers, erythema  Psych:  Alert and oriented to person, place, and time    ASSESSMENT:     Recurrent UTI  --Concerned given ongoing symptomatic UTI and CVA tenderness on left   --Completed 10 day course of Bactrim   --Continue methenamine BID and vitamin C  --Normal cystoscopy on 9/25 and retroperitoneal US on 9/22  --Recommend in and out catheterization for next urinalysis; will base treatment of possible UTI on that culture   --Follow up with ID in 3 weeks     Irritable bowel syndrome with constipation  It is imperative patient have regular bowel movements to prevent recurrent UTI. On laxative currently. Has colonoscopy follow up with GI.      PLAN:     Diagnoses and all orders for this visit:    Recurrent UTI    Irritable bowel syndrome with constipation          The total time for evaluation and management services performed on 10/5/23 was greater than 25 minutes.        Beto Bourgeois, DO   Infectious Diseases

## 2023-10-16 ENCOUNTER — CLINICAL SUPPORT (OUTPATIENT)
Dept: UROLOGY | Facility: CLINIC | Age: 53
End: 2023-10-16
Payer: COMMERCIAL

## 2023-10-16 DIAGNOSIS — N39.0 RECURRENT UTI: Primary | ICD-10-CM

## 2023-10-16 PROCEDURE — 87186 SC STD MICRODIL/AGAR DIL: CPT | Performed by: NURSE PRACTITIONER

## 2023-10-16 PROCEDURE — 81001 URINALYSIS AUTO W/SCOPE: CPT | Performed by: NURSE PRACTITIONER

## 2023-10-16 PROCEDURE — 87088 URINE BACTERIA CULTURE: CPT | Performed by: NURSE PRACTITIONER

## 2023-10-16 PROCEDURE — 87086 URINE CULTURE/COLONY COUNT: CPT | Performed by: NURSE PRACTITIONER

## 2023-10-16 PROCEDURE — 87077 CULTURE AEROBIC IDENTIFY: CPT | Performed by: NURSE PRACTITIONER

## 2023-10-16 NOTE — PROGRESS NOTES
Pt came in for a in and out catheter urinalysis today. Pt was instructed to undress from the waist down and lay on the table in a johnson's position with her feet in the stirrups. A sterile catheter was placed inside the pt's bladder to extract the urine, however, no urine went into the vial. Pt was then instructed to cough several times until the vial was filled with 7ml of urine. Pt was then instructed to get back dressed and was informed that her urine will be sent off for testing and we will contact her with the results.

## 2023-10-17 LAB
BACTERIA #/AREA URNS AUTO: ABNORMAL /HPF
BILIRUB UR QL STRIP: NEGATIVE
CLARITY UR REFRACT.AUTO: ABNORMAL
COLOR UR AUTO: YELLOW
GLUCOSE UR QL STRIP: NEGATIVE
HGB UR QL STRIP: NEGATIVE
KETONES UR QL STRIP: NEGATIVE
LEUKOCYTE ESTERASE UR QL STRIP: ABNORMAL
MICROSCOPIC COMMENT: ABNORMAL
NITRITE UR QL STRIP: POSITIVE
PH UR STRIP: 6 [PH] (ref 5–8)
PROT UR QL STRIP: NEGATIVE
RBC #/AREA URNS AUTO: 1 /HPF (ref 0–4)
SP GR UR STRIP: 1.01 (ref 1–1.03)
URN SPEC COLLECT METH UR: ABNORMAL
WBC #/AREA URNS AUTO: >100 /HPF (ref 0–5)
WBC CLUMPS UR QL AUTO: ABNORMAL

## 2023-10-18 LAB — BACTERIA UR CULT: ABNORMAL

## 2023-10-19 RX ORDER — CEFDINIR 300 MG/1
300 CAPSULE ORAL 2 TIMES DAILY
Qty: 28 CAPSULE | Refills: 0 | Status: SHIPPED | OUTPATIENT
Start: 2023-10-19 | End: 2023-10-20

## 2023-10-20 ENCOUNTER — PATIENT MESSAGE (OUTPATIENT)
Dept: UROLOGY | Facility: CLINIC | Age: 53
End: 2023-10-20
Payer: COMMERCIAL

## 2023-10-20 RX ORDER — CEFDINIR 300 MG/1
300 CAPSULE ORAL 2 TIMES DAILY
Qty: 28 CAPSULE | Refills: 0 | Status: SHIPPED | OUTPATIENT
Start: 2023-10-20 | End: 2023-11-03

## 2023-10-27 ENCOUNTER — OFFICE VISIT (OUTPATIENT)
Dept: INFECTIOUS DISEASES | Facility: CLINIC | Age: 53
End: 2023-10-27
Payer: COMMERCIAL

## 2023-10-27 VITALS
WEIGHT: 130 LBS | BODY MASS INDEX: 19.26 KG/M2 | SYSTOLIC BLOOD PRESSURE: 118 MMHG | DIASTOLIC BLOOD PRESSURE: 76 MMHG | HEIGHT: 69 IN

## 2023-10-27 DIAGNOSIS — K58.1 IRRITABLE BOWEL SYNDROME WITH CONSTIPATION: ICD-10-CM

## 2023-10-27 DIAGNOSIS — N39.0 RECURRENT UTI: ICD-10-CM

## 2023-10-27 PROCEDURE — 3078F PR MOST RECENT DIASTOLIC BLOOD PRESSURE < 80 MM HG: ICD-10-PCS | Mod: CPTII,S$GLB,, | Performed by: STUDENT IN AN ORGANIZED HEALTH CARE EDUCATION/TRAINING PROGRAM

## 2023-10-27 PROCEDURE — 99214 OFFICE O/P EST MOD 30 MIN: CPT | Mod: S$GLB,,, | Performed by: STUDENT IN AN ORGANIZED HEALTH CARE EDUCATION/TRAINING PROGRAM

## 2023-10-27 PROCEDURE — 3074F PR MOST RECENT SYSTOLIC BLOOD PRESSURE < 130 MM HG: ICD-10-PCS | Mod: CPTII,S$GLB,, | Performed by: STUDENT IN AN ORGANIZED HEALTH CARE EDUCATION/TRAINING PROGRAM

## 2023-10-27 PROCEDURE — 3008F BODY MASS INDEX DOCD: CPT | Mod: CPTII,S$GLB,, | Performed by: STUDENT IN AN ORGANIZED HEALTH CARE EDUCATION/TRAINING PROGRAM

## 2023-10-27 PROCEDURE — 3078F DIAST BP <80 MM HG: CPT | Mod: CPTII,S$GLB,, | Performed by: STUDENT IN AN ORGANIZED HEALTH CARE EDUCATION/TRAINING PROGRAM

## 2023-10-27 PROCEDURE — 99214 PR OFFICE/OUTPT VISIT, EST, LEVL IV, 30-39 MIN: ICD-10-PCS | Mod: S$GLB,,, | Performed by: STUDENT IN AN ORGANIZED HEALTH CARE EDUCATION/TRAINING PROGRAM

## 2023-10-27 PROCEDURE — 3008F PR BODY MASS INDEX (BMI) DOCUMENTED: ICD-10-PCS | Mod: CPTII,S$GLB,, | Performed by: STUDENT IN AN ORGANIZED HEALTH CARE EDUCATION/TRAINING PROGRAM

## 2023-10-27 PROCEDURE — 99999 PR PBB SHADOW E&M-EST. PATIENT-LVL III: ICD-10-PCS | Mod: PBBFAC,,, | Performed by: STUDENT IN AN ORGANIZED HEALTH CARE EDUCATION/TRAINING PROGRAM

## 2023-10-27 PROCEDURE — 1159F MED LIST DOCD IN RCRD: CPT | Mod: CPTII,S$GLB,, | Performed by: STUDENT IN AN ORGANIZED HEALTH CARE EDUCATION/TRAINING PROGRAM

## 2023-10-27 PROCEDURE — 1159F PR MEDICATION LIST DOCUMENTED IN MEDICAL RECORD: ICD-10-PCS | Mod: CPTII,S$GLB,, | Performed by: STUDENT IN AN ORGANIZED HEALTH CARE EDUCATION/TRAINING PROGRAM

## 2023-10-27 PROCEDURE — 99999 PR PBB SHADOW E&M-EST. PATIENT-LVL III: CPT | Mod: PBBFAC,,, | Performed by: STUDENT IN AN ORGANIZED HEALTH CARE EDUCATION/TRAINING PROGRAM

## 2023-10-27 PROCEDURE — 3074F SYST BP LT 130 MM HG: CPT | Mod: CPTII,S$GLB,, | Performed by: STUDENT IN AN ORGANIZED HEALTH CARE EDUCATION/TRAINING PROGRAM

## 2023-10-27 NOTE — ASSESSMENT & PLAN NOTE
--Normal cystoscopy on 9/25 and retroperitoneal US on 9/22  --Urology visit on 10/16 for in and out cath U/A showed pyuria and growth of pan S Klebsiella pneumoniae     --Currently on 14 day course of cefdinir 300 mg BID; EOC 11/3/23    --Continue methenamine BID and vitamin C  --Methenamine requires monthly BMP to ensure no renal insufficiency; will obtain week of 11/6   --Recommend in and out catheterization any time patient is symptomatic   --Follow up with ID in 1 month

## 2023-10-27 NOTE — PROGRESS NOTES
Infectious Disease Clinic Note    Patient Name: Tamiko Bettencourt  YOB: 1970    PRESENTING HISTORY       History of Present Illness:  Ms. Tamiko Bettencourt is a 53 y.o. female w/ significant PMHx of IBS, hypothyroid, and PTSD initially seen as referral for recurrent UTI. Reported in February 2023 had really bad ovarian pain. Diagnosed with UTI that had spread to [place] [facility]. Has since had at least 6 positive urine cultures. Has seen urology outpatient and treated as well. Was treated with ciprofloxacin for E coli and then Klebsiella now being treated with Bactrim. Tried Macrobid suppression but UTI still broke through. Minimal resolution so far after few days of Bactrim. Had stress incontinence that was treated with mesh/sling previously. No longer incontinent. Does experience bad constipation relieved with Dulcolax. UTI symptoms include bladder spasms, painful postvoid, cloudiness, malodor, low grade fever, and fatigue. Also has an ongoing discomfort along left side of her lower back. No high grade fever or chills. Discussed a number of OTC and prescription medications to help prevent UTI. Interested in trying methenamine and vitamin C. Would also like urology to perform cystoscopy. Discussed in and out catheterized urinalysis if symptoms persist. Will continue Bactrim for now but may need to consider IV therapy if symptoms worsen.     Urology performed cystoscopy in clinic on 9/25/23. No urothelial lesion seen. Retroperitoneal US reviewed from 9/22 and was normal. Patient completed 10 day course of Bactrim on 9/18. Today she reports burning and cloudy urine the past few days. Constipated up to 2 weeks. Has been taking methenamine which has helped. Completed course of Pyridium. Will check Cr today due to methenamine use.    Patient continues to have left sided flank pain. High temp at home 99.4F. No unexpected weight loss. No medication changes. Cr  reviewed from labs 10/5 normal. Urology performed in/out cath on 10/16 which showed >100 WBC and culture grew Klebsiella pneumoniae with pan susceptibility. Urology prescribed 14 day course of cefdinir. Discussed importance of regular BM again. No GI follow up on file. Will place referral.       Review of Systems:  Constitutional: no fever or chills  Eyes: no visual changes  ENT: no nasal congestion or sore throat  Respiratory: no cough or shortness of breath  Cardiovascular: no chest pain  Gastrointestinal: no nausea or vomiting, no abdominal pain, no constipation, no diarrhea  Genitourinary: no hematuria or dysuria  Musculoskeletal: no arthralgias or myalgias; left flank tenderness   Skin: no rash  Neurological: no headaches, numbness, or paresthesias    The following portions of the patient's history were reviewed and updated as appropriate: allergies, current medications, past family history, past medical history, past social history, past surgical history, and problem list.    PAST HISTORY:     Immunization History   Administered Date(s) Administered    COVID-19, MRNA, LN-S, PF (MODERNA FULL 0.5 ML DOSE) 2021, 2021    Influenza 10/15/2019, 2020    Influenza - Quadrivalent 2017, 10/15/2019    MMR 2020    PPD Test 2019    Tdap 2017, 2020       Past Medical History:   Diagnosis Date    ADD (attention deficit disorder)     Hypothyroidism     Irritable bowel syndrome (IBS)     Migraine     Post Traumatic Stress Disorder     Her Son Was A Marine Who [facility]    Therapeutic Drug Monitoring        Past Surgical History:   Procedure Laterality Date    APPENDECTOMY      AUGMENTATION OF BREAST      BREAST SURGERY      Augmentation      SECTION      COLONOSCOPY  2019    Dr. Wong    HYSTERECTOMY  2010    partial    INGUINAL HERNIA REPAIR Left 2017    KNEE SURGERY Left        Family History   Problem Relation Age of Onset     Diabetes Mother     Colon cancer Mother     Prostate cancer Father     Cancer Sister     Breast cancer Paternal Aunt         age unknown    Breast cancer Paternal Aunt         age unknown    COPD Maternal Grandmother     Rheum arthritis Maternal Grandmother     Hypothyroidism Maternal Grandmother     Cancer Maternal Grandfather     Diabetes Paternal Grandmother     Diverticulitis Paternal Grandmother     Parkinsonism Paternal Grandmother     Prostate cancer Paternal Grandfather     Emphysema Paternal Grandfather         non-smoker     Breast cancer Other         age unknown       Social History     Socioeconomic History    Marital status:    Tobacco Use    Smoking status: Never    Smokeless tobacco: Never   Substance and Sexual Activity    Alcohol use: No    Drug use: No    Sexual activity: Yes     Partners: Male   Social History Narrative    ** Merged History Encounter **            MEDICATIONS & ALLERGIES:     Current Outpatient Medications on File Prior to Visit   Medication Sig    amitriptyline (ELAVIL) 50 MG tablet Take 1 tablet (50 mg total) by mouth every evening.    cefdinir (OMNICEF) 300 MG capsule Take 1 capsule (300 mg total) by mouth 2 (two) times daily. for 14 days    clonazePAM (KLONOPIN) 1 MG tablet TAKE ONE TABLET BY MOUTH THREE TIMES DAILY    EScitalopram oxalate (LEXAPRO) 20 MG tablet TAKE TWO TABLETS BY MOUTH EVERY DAY    estradioL (ESTRACE) 0.01 % (0.1 mg/gram) vaginal cream Place 4 g vaginally once daily.    levothyroxine (SYNTHROID) 112 MCG tablet TAKE ONE TABLET BY MOUTH ONCE DAILY.    lisdexamfetamine (VYVANSE) 50 MG capsule TAKE ONE CAPSULE BY MOUTH IN THE MORNING    methenamine (HIPREX) 1 gram Tab Take 1 tablet (1 g total) by mouth 2 (two) times daily.    promethazine (PHENERGAN) 25 MG tablet Take 25 mg by mouth every 6 (six) hours as needed.    QUEtiapine (SEROQUEL) 100 MG Tab TAKE TWO TABLETS BY MOUTH EVERY EVENING    valACYclovir (VALTREX) 1000 MG tablet Take 1 tablet (1,000 mg  total) by mouth once daily. (Patient taking differently: Take 1,000 mg by mouth once daily. As needed)     No current facility-administered medications on file prior to visit.       Review of patient's allergies indicates:  No Known Allergies    OBJECTIVE:   Vital Signs:  There were no vitals filed for this visit.    No results found for this or any previous visit (from the past 24 hour(s)).      Physical Exam:   General:  Well developed, well nourished, no acute distress  HEENT:  Normocephalic, atraumatic  CVS:  RRR, S1 and S2 normal, no murmurs, rubs, gallops  Resp:  Lungs clear to auscultation, no wheezes, rales, rhonchi  GI:  Abdomen soft, non-tender, non-distended, normoactive bowel sounds, no masses  MSK:  No muscle atrophy, peripheral edema, full range of motion; left CVA tenderness   Skin:  No rashes, ulcers, erythema  Psych:  Alert and oriented to person, place, and time    ASSESSMENT:     Irritable bowel syndrome with constipation  It is imperative patient have regular bowel movements to prevent recurrent UTI. On laxative currently. Needs GI follow up.     Recurrent UTI  --Normal cystoscopy on 9/25 and retroperitoneal US on 9/22  --Urology visit on 10/16 for in and out cath U/A showed pyuria and growth of pan S Klebsiella pneumoniae     --Currently on 14 day course of cefdinir 300 mg BID; EOC 11/3/23    --Continue methenamine BID and vitamin C  --Methenamine requires monthly BMP to ensure no renal insufficiency; will obtain week of 11/6   --Recommend in and out catheterization after completing cefdinir course   --Follow up with ID in 3 weeks     PLAN:     Diagnoses and all orders for this visit:    Irritable bowel syndrome with constipation    Recurrent UTI      The total time for evaluation and management services performed on 10/27/23 was greater than 25 minutes.        Beto Bourgeois, DO   Infectious Diseases

## 2023-10-27 NOTE — ASSESSMENT & PLAN NOTE
It is imperative patient have regular bowel movements to prevent recurrent UTI. On laxative currently. Needs GI follow up.

## 2023-11-16 ENCOUNTER — CLINICAL SUPPORT (OUTPATIENT)
Dept: UROLOGY | Facility: CLINIC | Age: 53
End: 2023-11-16
Payer: COMMERCIAL

## 2023-11-16 DIAGNOSIS — N39.0 RECURRENT UTI: Primary | ICD-10-CM

## 2023-11-16 PROCEDURE — 81003 URINALYSIS AUTO W/O SCOPE: CPT | Performed by: NURSE PRACTITIONER

## 2023-11-16 NOTE — PROGRESS NOTES
Pt was instructed to undress from the waist down and lay in a johnson's position on the table with her feet in the stirrups. An in and out catheter was inserted into her bladder through her urethra to collect a urine sample for a testing. Pt was then instructed to get dressed and was discharged, she was also informed that her urine will be sent off for testing and will be contacted with the results.

## 2023-11-17 LAB
BILIRUB UR QL STRIP: NEGATIVE
CLARITY UR REFRACT.AUTO: CLEAR
COLOR UR AUTO: YELLOW
GLUCOSE UR QL STRIP: NEGATIVE
HGB UR QL STRIP: NEGATIVE
KETONES UR QL STRIP: NEGATIVE
LEUKOCYTE ESTERASE UR QL STRIP: NEGATIVE
NITRITE UR QL STRIP: NEGATIVE
PH UR STRIP: 7 [PH] (ref 5–8)
PROT UR QL STRIP: NEGATIVE
SP GR UR STRIP: 1.01 (ref 1–1.03)
URN SPEC COLLECT METH UR: NORMAL

## 2023-12-11 PROBLEM — N39.0 RECURRENT UTI: Status: RESOLVED | Noted: 2023-09-11 | Resolved: 2023-12-11

## 2023-12-26 DIAGNOSIS — B00.9 HERPES: ICD-10-CM

## 2023-12-26 RX ORDER — ESTRADIOL 0.1 MG/G
4 CREAM VAGINAL DAILY
Qty: 120 G | Refills: 3 | Status: SHIPPED | OUTPATIENT
Start: 2023-12-26 | End: 2024-03-01

## 2023-12-26 RX ORDER — VALACYCLOVIR HYDROCHLORIDE 1 G/1
1000 TABLET, FILM COATED ORAL DAILY
Qty: 30 TABLET | Refills: 11 | Status: SHIPPED | OUTPATIENT
Start: 2023-12-26 | End: 2024-12-25

## 2024-03-07 ENCOUNTER — LAB VISIT (OUTPATIENT)
Dept: LAB | Facility: HOSPITAL | Age: 54
End: 2024-03-07
Payer: COMMERCIAL

## 2024-03-07 DIAGNOSIS — E03.4 HYPOTHYROIDISM DUE TO ACQUIRED ATROPHY OF THYROID: ICD-10-CM

## 2024-03-07 LAB — TSH SERPL DL<=0.005 MIU/L-ACNC: 1.2 UIU/ML (ref 0.4–4)

## 2024-03-07 PROCEDURE — 36415 COLL VENOUS BLD VENIPUNCTURE: CPT | Mod: PO

## 2024-03-07 PROCEDURE — 84443 ASSAY THYROID STIM HORMONE: CPT

## 2024-03-13 NOTE — PROGRESS NOTES
GI OUTPATIENT NOTE    PCP: Brett Graves II [id number] / [facility] [id number]    Chief Complaint   Patient presents with    Constipation     [facility]     Other     Redundant colon per pt       HISTORY OF PRESENT ILLNESS:  53 y.o. female presents to the GI clinic today for initial evaluation. The patient complains of chronic constipation. The patient reports a long history of constipation. She has a BM about every two weeks with the use of Dulcolax and other OTC medications, but she is needing higher and higher doses to get any results. In the past she tried Trulance and Linzess (?dose) without any improvement. She gets some nausea and bloating with the constipation, but no vomiting, change in appetite, weight loss, hematochezia, melena or significant abdominal pain. Her first colonoscopy was . She had a benign polyp and redundant colon. She had a repeat colonoscopy a few months ago that was normal. Her mother  from stage IV colon cancer at 69 yo.     Past Medical History:   Diagnosis Date    ADD (attention deficit disorder)     Hypothyroidism     Irritable bowel syndrome (IBS)     Migraine     Post Traumatic Stress Disorder     Her Son Was A Marine Who Was Killed While Serving In [employer]    Therapeutic Drug Monitoring        Past Surgical History:   Procedure Laterality Date    APPENDECTOMY      AUGMENTATION OF BREAST      BREAST SURGERY      Augmentation      SECTION      COLONOSCOPY  2019    Dr. Wong    HYSTERECTOMY  2010    partial    INGUINAL HERNIA REPAIR Left 2017    KNEE SURGERY Left        Family History   Problem Relation Age of Onset    Diabetes Mother     Colon cancer Mother     Prostate cancer Father     Cancer Sister     Breast cancer Paternal Aunt         age unknown    Breast cancer Paternal Aunt         age unknown    COPD Maternal Grandmother     Rheum arthritis Maternal Grandmother     Hypothyroidism Maternal  Grandmother     Cancer Maternal Grandfather     Diabetes Paternal Grandmother     Diverticulitis Paternal Grandmother     Parkinsonism Paternal Grandmother     Prostate cancer Paternal Grandfather     Emphysema Paternal Grandfather         non-smoker     Breast cancer Other         age unknown       MEDS/ALLERGY:  The patient's medications and allergies were reviewed and updated in the EPIC chart.     Review of Systems  As per HPI    Physical Exam  Constitutional:       Appearance: Normal appearance. She is well-developed.   HENT:      Head: Normocephalic and atraumatic.   Eyes:      Extraocular Movements: Extraocular movements intact.   Cardiovascular:      Rate and Rhythm: Normal rate and regular rhythm.      Heart sounds: No murmur heard.  Pulmonary:      Effort: Pulmonary effort is normal. No respiratory distress.      Breath sounds: Normal breath sounds. No wheezing.   Abdominal:      General: Bowel sounds are normal. There is no distension.      Palpations: Abdomen is soft. There is no mass.      Tenderness: There is no abdominal tenderness.   Musculoskeletal:      Cervical back: Normal range of motion and neck supple.   Skin:     General: Skin is warm and dry.      Findings: No rash.   Neurological:      Mental Status: She is alert and oriented to person, place, and time.      Cranial Nerves: No cranial nerve deficit.      Gait: Gait normal.   Psychiatric:         Thought Content: Thought content normal.         LABS/IMAGING:   Pertinent results were reviewed.     ASSESSMENT:  1. Chronic constipation    2. Bloating    3. Nausea        PLAN:  Since she has already tried two prescription medications in the past, I recommend a stiz marker test first. Discussed the test and information that could be gathered. Will make further recommendations when results are available. May consider referral to Dr. Galvez.     ORDER SUMMARY:  Orders Placed This Encounter   Procedures    X-Ray Abdomen Flat And Erect    X-Ray  Abdomen Flat And Erect        Follow up if symptoms worsen or fail to improve.     Thank you for the opportunity to participate in the care of this patient. This consult was designated to me by my supervising physician.   Coleman Lawler PA-C.

## 2024-03-14 ENCOUNTER — TELEPHONE (OUTPATIENT)
Dept: GASTROENTEROLOGY | Facility: CLINIC | Age: 54
End: 2024-03-14

## 2024-03-14 ENCOUNTER — HOSPITAL ENCOUNTER (OUTPATIENT)
Dept: RADIOLOGY | Facility: HOSPITAL | Age: 54
Discharge: HOME | End: 2024-03-14
Attending: PHYSICIAN ASSISTANT
Payer: COMMERCIAL

## 2024-03-14 ENCOUNTER — OFFICE VISIT (OUTPATIENT)
Dept: GASTROENTEROLOGY | Facility: CLINIC | Age: 54
End: 2024-03-14
Payer: COMMERCIAL

## 2024-03-14 VITALS
HEIGHT: 69 IN | DIASTOLIC BLOOD PRESSURE: 73 MMHG | HEART RATE: 105 BPM | WEIGHT: 139.31 LBS | SYSTOLIC BLOOD PRESSURE: 108 MMHG | BODY MASS INDEX: 20.63 KG/M2

## 2024-03-14 DIAGNOSIS — K59.09 CHRONIC CONSTIPATION: Primary | ICD-10-CM

## 2024-03-14 DIAGNOSIS — R14.0 BLOATING: ICD-10-CM

## 2024-03-14 DIAGNOSIS — R11.0 NAUSEA: ICD-10-CM

## 2024-03-14 DIAGNOSIS — K59.09 CHRONIC CONSTIPATION: ICD-10-CM

## 2024-03-14 PROCEDURE — 1159F MED LIST DOCD IN RCRD: CPT | Mod: CPTII,S$GLB,, | Performed by: PHYSICIAN ASSISTANT

## 2024-03-14 PROCEDURE — 99999 PR PBB SHADOW E&M-EST. PATIENT-LVL V: CPT | Mod: PBBFAC,,, | Performed by: PHYSICIAN ASSISTANT

## 2024-03-14 PROCEDURE — 74019 RADEX ABDOMEN 2 VIEWS: CPT | Mod: TC

## 2024-03-14 PROCEDURE — 74019 RADEX ABDOMEN 2 VIEWS: CPT | Mod: 26,,, | Performed by: RADIOLOGY

## 2024-03-14 PROCEDURE — 3078F DIAST BP <80 MM HG: CPT | Mod: CPTII,S$GLB,, | Performed by: PHYSICIAN ASSISTANT

## 2024-03-14 PROCEDURE — 3074F SYST BP LT 130 MM HG: CPT | Mod: CPTII,S$GLB,, | Performed by: PHYSICIAN ASSISTANT

## 2024-03-14 PROCEDURE — 99203 OFFICE O/P NEW LOW 30 MIN: CPT | Mod: S$GLB,,, | Performed by: PHYSICIAN ASSISTANT

## 2024-03-14 PROCEDURE — 3008F BODY MASS INDEX DOCD: CPT | Mod: CPTII,S$GLB,, | Performed by: PHYSICIAN ASSISTANT

## 2024-03-14 RX ORDER — RADIOPAQUE PVC MARKERS/BARIUM 24MARKERS
1 CAPSULE ORAL ONCE
Refills: 0
Start: 2024-03-14 | End: 2024-03-14

## 2024-03-14 NOTE — TELEPHONE ENCOUNTER
Pt was seen today by Ms Bucky PA-C, a sitzmarks was ordered.    Pt was scheduled for xray today and one on Tues 03/19/24, appts scheduled.  Sitzmarks capsule given to pt to swallow today after xray or early in the morning. She agreed to plan and verbalized understanding.     Sitzmarks  NDC: [phone] [id number]  Lot: [id number]   Exp:09/2024

## 2024-03-15 ENCOUNTER — TELEPHONE (OUTPATIENT)
Dept: GASTROENTEROLOGY | Facility: CLINIC | Age: 54
End: 2024-03-15
Payer: COMMERCIAL

## 2024-03-15 NOTE — TELEPHONE ENCOUNTER
Requested medical records for last EGD/Colonoscopy with path reports from [facility] in [facility].   Fax [phone].

## 2024-03-19 ENCOUNTER — HOSPITAL ENCOUNTER (OUTPATIENT)
Dept: RADIOLOGY | Facility: HOSPITAL | Age: 54
Discharge: HOME | End: 2024-03-19
Attending: PHYSICIAN ASSISTANT
Payer: COMMERCIAL

## 2024-03-19 DIAGNOSIS — R14.0 BLOATING: ICD-10-CM

## 2024-03-19 DIAGNOSIS — K59.09 CHRONIC CONSTIPATION: ICD-10-CM

## 2024-03-19 PROCEDURE — 74018 RADEX ABDOMEN 1 VIEW: CPT | Mod: 26,,, | Performed by: RADIOLOGY

## 2024-03-19 PROCEDURE — 74018 RADEX ABDOMEN 1 VIEW: CPT | Mod: TC

## 2024-03-22 ENCOUNTER — TELEPHONE (OUTPATIENT)
Dept: GASTROENTEROLOGY | Facility: CLINIC | Age: 54
End: 2024-03-22
Payer: COMMERCIAL

## 2024-03-22 ENCOUNTER — DOCUMENTATION ONLY (OUTPATIENT)
Dept: GASTROENTEROLOGY | Facility: HOSPITAL | Age: 54
End: 2024-03-22
Payer: COMMERCIAL

## 2024-03-22 DIAGNOSIS — K59.04 CHRONIC IDIOPATHIC CONSTIPATION: Primary | ICD-10-CM

## 2024-03-22 RX ORDER — LUBIPROSTONE 24 UG/1
24 CAPSULE ORAL 2 TIMES DAILY WITH MEALS
Qty: 60 CAPSULE | Refills: 3 | Status: SHIPPED | OUTPATIENT
Start: 2024-03-22

## 2024-03-22 NOTE — TELEPHONE ENCOUNTER
Got outside records from [facility].   Scanned to pts chart under media. Sent to Ms Lalwer to review.

## 2024-03-22 NOTE — PROGRESS NOTES
Outside records received.   EGD (08/2023): small hiatal hernia, gastritis, neg. for H. Pylori.   Colonoscopy (08/2023): hemorrhoids. Repeat 5 years.

## 2024-03-27 ENCOUNTER — TELEPHONE (OUTPATIENT)
Dept: SURGERY | Facility: CLINIC | Age: 54
End: 2024-03-27
Payer: COMMERCIAL

## 2024-03-27 NOTE — TELEPHONE ENCOUNTER
RN called pt in regards to message left. Pt needed to reschedule referral appt with Dr. Galvez. Successfully scheduled patient on 4/23 at 1PM. Pt verbalized understanding and has no further questions at this time.

## 2024-04-25 ENCOUNTER — OFFICE VISIT (OUTPATIENT)
Dept: SURGERY | Facility: CLINIC | Age: 54
End: 2024-04-25
Payer: COMMERCIAL

## 2024-04-25 VITALS
BODY MASS INDEX: 21.55 KG/M2 | OXYGEN SATURATION: 98 % | TEMPERATURE: 99 F | SYSTOLIC BLOOD PRESSURE: 112 MMHG | WEIGHT: 145.5 LBS | HEART RATE: 94 BPM | HEIGHT: 69 IN | DIASTOLIC BLOOD PRESSURE: 75 MMHG

## 2024-04-25 DIAGNOSIS — K59.04 CHRONIC IDIOPATHIC CONSTIPATION: ICD-10-CM

## 2024-04-25 DIAGNOSIS — K56.41 OBSTRUCTIVE DEFECATION: Primary | ICD-10-CM

## 2024-04-25 PROCEDURE — 99205 OFFICE O/P NEW HI 60 MIN: CPT | Mod: S$GLB,,, | Performed by: STUDENT IN AN ORGANIZED HEALTH CARE EDUCATION/TRAINING PROGRAM

## 2024-04-25 PROCEDURE — 99999 PR PBB SHADOW E&M-EST. PATIENT-LVL IV: CPT | Mod: PBBFAC,,, | Performed by: STUDENT IN AN ORGANIZED HEALTH CARE EDUCATION/TRAINING PROGRAM

## 2024-04-25 NOTE — PROGRESS NOTES
Subjective:       Patient ID: Tamiko Bettencourt is a 53 y.o. female.    Chief Complaint: No chief complaint on file.    Patient is a 54yo F who presents with chronic constipation. She has been constipated most of her life and will have a bowel movement maybe once a week but typically every 2 weeks.  She has tried numerous over-the-counter medications as well as prescription without relief.  She reports that she occasionally feels as if her breath smells feculent.  Has significant straining and prolonged time on the toilet to have a bowel movement.        Colonoscopy: 2023  Internal hemorrhoids, otherwise normal    Pathology: none      FH: mother  of metastatic colorectal cancer at age 68      Sitz Marker Study:  Lung bases are clear. Heart size normal. Significant fecal burden again present primarily involving the large bowel and extending into the rectal vault. Sitzmarkers are distributed throughout the abdomen with overall pattern suggesting that most of them are in the large bowel bones normal.   Objective:      Physical Exam  Constitutional:       Appearance: She is well-developed.   HENT:      Head: Normocephalic and atraumatic.   Eyes:      Conjunctiva/sclera: Conjunctivae normal.      Pupils: Pupils are equal, round, and reactive to light.   Neck:      Thyroid: No thyromegaly.   Cardiovascular:      Rate and Rhythm: Normal rate and regular rhythm.   Pulmonary:      Effort: Pulmonary effort is normal. No respiratory distress.   Abdominal:      General: There is no distension.      Palpations: Abdomen is soft. There is no mass.      Tenderness: There is no abdominal tenderness.   Musculoskeletal:         General: No tenderness. Normal range of motion.      Cervical back: Normal range of motion.   Skin:     General: Skin is warm and dry.      Capillary Refill: Capillary refill takes less than 2 seconds.   Neurological:      General: No focal deficit present.      Mental Status: She is alert and oriented  to person, place, and time.       Anoscopy Procedure Note    Pre-procedure diagnosis: Constipation    Post-procedure diagnosis:  constipation    Procedure: Anoscopy    Surgeon: Yumiko Galvez MD    Assistant: GAEL Hidalgo RN      Findings:   External anal exam was within normal limits.  A digital rectal exam was performed with no masses identified and no defects.  There was appropriate squeeze of the sphincter complex with paradoxical relaxation of the puborectalis with push.    A lubricated anoscope was inserted in the anus and the anus inspected circumferentially with findings of G2 internal hemorrhoids      Patient tolerated procedure well.  Discharge home.     No follow-ups on file.    Assessment:       1. Obstructive defecation    2. Chronic idiopathic constipation        Plan:       - discussed with patient slow transit constipation and workup for moving towards total abdominal colectomy with ileorectal anastomosis.  - need to rule out pelvic floor dysfunction as well as obstructive defecation as a cause.  We will get anorectal manometry as well as MRI defecography  - if she returns with pelvic floor dysfunction will need referral to pelvic floor physical therapy  - we will discuss consideration of temporary ileostomy to rule out global dysmotility  - return to clinic after manometry and defecography result    >60 minutes were spent on chart review as well as face to face discussion with the patient including education on pathophysiology of disease and treatment recommendations          Yumiko Galvez MD  Colon and Rectal Surgery  [facility]

## 2024-04-30 ENCOUNTER — PATIENT MESSAGE (OUTPATIENT)
Dept: ENDOSCOPY | Facility: HOSPITAL | Age: 54
End: 2024-04-30
Payer: COMMERCIAL

## 2024-05-07 ENCOUNTER — PATIENT MESSAGE (OUTPATIENT)
Dept: SURGERY | Facility: CLINIC | Age: 54
End: 2024-05-07
Payer: COMMERCIAL

## 2024-05-13 ENCOUNTER — PATIENT MESSAGE (OUTPATIENT)
Dept: ENDOSCOPY | Facility: HOSPITAL | Age: 54
End: 2024-05-13
Payer: COMMERCIAL

## 2024-05-22 ENCOUNTER — TELEPHONE (OUTPATIENT)
Dept: SURGERY | Facility: CLINIC | Age: 54
End: 2024-05-22
Payer: COMMERCIAL

## 2024-05-22 NOTE — TELEPHONE ENCOUNTER
Contacting  general back per message sent, Lewis states that he needs the most current note from Dr. Galvez faxed to him for the patients MRI tomorrow, faxed over to [phone]----- Message from Gorge Shaw sent at 5/22/2024 12:13 PM CDT -----  Type:  Patient Call          Who Called: tommy Ibarra [facility]         Does the patient know what this is regarding?: Requesting a call back for more information that's needed for the pt appt that is scheduled for tomorrow ; a Doctors note is needed  please advise         Best Call Back Number:  [phone]             Additional Information: Fax [phone]

## 2024-05-26 ENCOUNTER — PATIENT MESSAGE (OUTPATIENT)
Dept: SURGERY | Facility: CLINIC | Age: 54
End: 2024-05-26
Payer: COMMERCIAL

## 2024-05-28 ENCOUNTER — TELEPHONE (OUTPATIENT)
Dept: SURGERY | Facility: CLINIC | Age: 54
End: 2024-05-28
Payer: COMMERCIAL

## 2024-05-28 NOTE — TELEPHONE ENCOUNTER
RN called pt back in regards to message left. Pt needs to reschedule appt with Dr. Galvez to after procedure dates. Successfully rescheduled pt's follow-up appt. Pt verbalized understanding and has no further questions at this time.     ----- Message from Miryam Li sent at 5/28/2024 10:36 AM CDT -----  Patient called regarding followup appt for after the procedure. Patient will like a call back call back [phone]

## 2024-06-02 ENCOUNTER — PATIENT MESSAGE (OUTPATIENT)
Dept: OBSTETRICS AND GYNECOLOGY | Facility: CLINIC | Age: 54
End: 2024-06-02
Payer: COMMERCIAL

## 2024-06-02 DIAGNOSIS — B00.9 HERPES: ICD-10-CM

## 2024-06-02 RX ORDER — VALACYCLOVIR HYDROCHLORIDE 1 G/1
1000 TABLET, FILM COATED ORAL DAILY
Qty: 30 TABLET | Refills: 0 | Status: SHIPPED | OUTPATIENT
Start: 2024-06-02 | End: 2024-07-02

## 2024-06-05 ENCOUNTER — PATIENT MESSAGE (OUTPATIENT)
Dept: SURGERY | Facility: CLINIC | Age: 54
End: 2024-06-05
Payer: COMMERCIAL

## 2024-06-07 ENCOUNTER — HOSPITAL ENCOUNTER (OUTPATIENT)
Facility: HOSPITAL | Age: 54
Discharge: HOME | End: 2024-06-07
Attending: STUDENT IN AN ORGANIZED HEALTH CARE EDUCATION/TRAINING PROGRAM | Admitting: STUDENT IN AN ORGANIZED HEALTH CARE EDUCATION/TRAINING PROGRAM
Payer: COMMERCIAL

## 2024-06-07 VITALS
TEMPERATURE: 98 F | OXYGEN SATURATION: 100 % | HEART RATE: 95 BPM | DIASTOLIC BLOOD PRESSURE: 87 MMHG | SYSTOLIC BLOOD PRESSURE: 127 MMHG | RESPIRATION RATE: 18 BRPM

## 2024-06-07 PROCEDURE — 91122 PR ANAL PRESSURE RECORD: CPT | Mod: 26,SP,, | Performed by: STUDENT IN AN ORGANIZED HEALTH CARE EDUCATION/TRAINING PROGRAM

## 2024-06-07 PROCEDURE — 91122 HC ANORECTAL MANOMETRY: CPT | Mod: TC,SP | Performed by: STUDENT IN AN ORGANIZED HEALTH CARE EDUCATION/TRAINING PROGRAM

## 2024-06-07 NOTE — PLAN OF CARE
Anorectal manometry performed. No difficulties or distress noted following cathter removal. Discharged to home.

## 2024-06-10 ENCOUNTER — OFFICE VISIT (OUTPATIENT)
Dept: UROLOGY | Facility: CLINIC | Age: 54
End: 2024-06-10
Payer: COMMERCIAL

## 2024-06-10 VITALS
TEMPERATURE: 100 F | BODY MASS INDEX: 22.34 KG/M2 | HEIGHT: 69 IN | SYSTOLIC BLOOD PRESSURE: 111 MMHG | WEIGHT: 150.81 LBS | HEART RATE: 107 BPM | DIASTOLIC BLOOD PRESSURE: 80 MMHG | RESPIRATION RATE: 18 BRPM

## 2024-06-10 DIAGNOSIS — N12 PYELONEPHRITIS: Primary | ICD-10-CM

## 2024-06-10 PROCEDURE — 3074F SYST BP LT 130 MM HG: CPT | Mod: CPTII,S$GLB,, | Performed by: NURSE PRACTITIONER

## 2024-06-10 PROCEDURE — 87088 URINE BACTERIA CULTURE: CPT | Performed by: NURSE PRACTITIONER

## 2024-06-10 PROCEDURE — 87077 CULTURE AEROBIC IDENTIFY: CPT | Performed by: NURSE PRACTITIONER

## 2024-06-10 PROCEDURE — 1159F MED LIST DOCD IN RCRD: CPT | Mod: CPTII,S$GLB,, | Performed by: NURSE PRACTITIONER

## 2024-06-10 PROCEDURE — 99999 PR PBB SHADOW E&M-EST. PATIENT-LVL IV: CPT | Mod: PBBFAC,,, | Performed by: NURSE PRACTITIONER

## 2024-06-10 PROCEDURE — 99214 OFFICE O/P EST MOD 30 MIN: CPT | Mod: 25,S$GLB,, | Performed by: NURSE PRACTITIONER

## 2024-06-10 PROCEDURE — 96372 THER/PROPH/DIAG INJ SC/IM: CPT | Mod: S$GLB,,, | Performed by: NURSE PRACTITIONER

## 2024-06-10 PROCEDURE — 87086 URINE CULTURE/COLONY COUNT: CPT | Performed by: NURSE PRACTITIONER

## 2024-06-10 PROCEDURE — 3008F BODY MASS INDEX DOCD: CPT | Mod: CPTII,S$GLB,, | Performed by: NURSE PRACTITIONER

## 2024-06-10 PROCEDURE — 87186 SC STD MICRODIL/AGAR DIL: CPT | Performed by: NURSE PRACTITIONER

## 2024-06-10 PROCEDURE — 3079F DIAST BP 80-89 MM HG: CPT | Mod: CPTII,S$GLB,, | Performed by: NURSE PRACTITIONER

## 2024-06-10 RX ORDER — CEFTRIAXONE 1 G/1
1 INJECTION, POWDER, FOR SOLUTION INTRAMUSCULAR; INTRAVENOUS
Status: COMPLETED | OUTPATIENT
Start: 2024-06-10 | End: 2024-06-10

## 2024-06-10 RX ORDER — LEVOFLOXACIN 500 MG/1
500 TABLET, FILM COATED ORAL DAILY
Qty: 10 TABLET | Refills: 0 | Status: SHIPPED | OUTPATIENT
Start: 2024-06-10 | End: 2024-06-20

## 2024-06-10 RX ADMIN — CEFTRIAXONE 1 G: 1 INJECTION, POWDER, FOR SOLUTION INTRAMUSCULAR; INTRAVENOUS at 03:06

## 2024-06-10 NOTE — PROGRESS NOTES
Chief Complaint:   Pyelonephritis    HPI:    Patient is a 53-year-old female that has been followed by this clinic in Infectious Disease secondary to recurrent urinary tract infections, urosepsis and pyelonephritis.  Patient states she has been asymptomatic for several months.  Reports dysuria and low-grade fever over the last 48 hours.  Urine in clinic indicates nitrates, leukocytes and blood, all other parameters are negative.  Patient does have a low-grade temperature 100.3° orally in the clinic.  Has had significant GI issues secondary to chronic constipation.  Is now being followed by a colorectal surgeon and is very encouraged of treatment and plan of care.  09/05/2024  Patient is familiar to our clinic secondary to recurrent urinary tract infections.  Patient recently completed antibiotics secondary to Klebsiella positive urine culture.  Patient states that over the last 3 days she is had slight dysuria.  Denies pelvic or flank pain.  Denies gross hematuria or fever.  Urine in clinic indicates leukocytes and nitrates, all other parameters are negative.  Patient has an upcoming appointment with Infectious Disease secondary to 9 positive urine cultures in the past 9 months despite nightly antibiotics.  08/23/2023  Patient is a 52-year-old female that has been followed by this clinic for recurrent E coli cystitis.  Was recently seen by urgent care for possible positive urine culture, and empirically treated with Cipro.  Patient is presenting as a follow-up.  Urine in clinic is negative.  Patient has recurrent E coli cystitis despite prophylactic nightly antibiotics.  Patient has chronic constipation that requires bowel prep for elimination.  Is followed by GI.  Has an upcoming appointment with infectious disease.  07/13/2023  Patient has been followed by this clinic for recurrent E coli cystitis.  Was recently prescribed nightly antibiotics, Macrobid, for 3 months.  Unfortunately, patient reports dysuria and  pelvic pain for 2 days.  Urine in clinic indicates nitrates leukocytes and trace blood, all other parameters are negative.  Denies gross hematuria or fever.  Patient has a long history of chronic constipation, defecating every 10-12 days, requiring bowel prep for elimination.  Is followed by GI.  06/23/2023  Patient is a 52-year-old female that is presenting as a follow-up to recurrent urinary tract infections.  Patient recently was treated for E coli cystitis.  Urine in clinic is negative and PVR is 13 mL.  Patient is currently asymptomatic.  Had a recent CT abdomen pelvis that was negative for renal abnormalities.  Patient states that she has severe constipation and only defecates every 10-12 days.  Reports that E coli UTIs are associated with her bowel prep for defecation.  03/14/2023  Patient is a 52-year-old female that is presenting with a history of recurrent urinary tract infections and was recently seen in ED for pyelonephritis.  Patient states that she was prescribed Cipro, twice, and is currently asymptomatic.  Urine in clinic is negative and PVR is 0 mL.  No history of gross hematuria or renal stones.  Patient states that she has chronic constipation, only has 2-3 bowel movements a month.  Reports that urine cultures always indicate E coli.    Allergies:  Patient has no known allergies.    Medications:  has a current medication list which includes the following prescription(s): amitriptyline, clonazepam, escitalopram oxalate, lisdexamfetamine, lubiprostone, promethazine, quetiapine, valacyclovir, eletriptan, and levofloxacin, and the following Facility-Administered Medications: ceftriaxone.    Review of Systems:  General: + fever and chills  Skin: No rashes, itching, or changes in color or texture of skin.  Chest: Denies QUINN, cyanosis, wheezing, cough, and sputum production.  Abdomen: See HPI  Musculoskeletal: No joint stiffness or swelling. Denies back pain.  : As above.  All other review of systems  negative.    PMH:   has a past medical history of ADD (attention deficit disorder), Hypothyroidism, Irritable bowel syndrome (IBS), Migraine, Post Traumatic Stress Disorder, and Therapeutic Drug Monitoring.    PSH:   has a past surgical history that includes  section; Knee surgery (Left); Appendectomy; Breast surgery; Hysterectomy (); Inguinal hernia repair (Left, 2017); Colonoscopy (2019); Augmentation of breast; and Anorectal manometry (N/A, 2024).    FamHx: family history includes Breast cancer in her paternal aunt, paternal aunt and another family member; COPD in her maternal grandmother; Cancer in her maternal grandfather and sister; Colon cancer in her mother; Diabetes in her mother and paternal grandmother; Diverticulitis in her paternal grandmother; Emphysema in her paternal grandfather; Hypothyroidism in her maternal grandmother; Parkinsonism in her paternal grandmother; Prostate cancer in her father and paternal grandfather; Rheum arthritis in her maternal grandmother.    SocHx:  reports that she has never smoked. She has never been exposed to tobacco smoke. She has never used smokeless tobacco. She reports that she does not drink alcohol and does not use drugs.      Physical Exam:  Vitals:    06/10/24 1503   BP: 111/80   Pulse: 107   Resp: 18   Temp: 100.3 °F (37.9 °C)     General: A&Ox3, no apparent distress, no deformities  Neck: No masses, normal thyroid  Lungs: normal inspiration, no use of accessory muscles  Heart: normal pulse, no arrhythmias  Abdomen: Soft, NT, ND, no masses, no hernias, no CVA tenderness  Labs/Studies:   See HPI    Impression/Plan:     Pyelonephritis   Urine sent for culture   Rocephin 1 g IM in clinic   Levaquin 500 mg once daily for 10 days   Return to clinic in 7 days for re-evaluation

## 2024-06-10 NOTE — PROGRESS NOTES
..Using aseptic technique, Rocephin 1 gm adm to right ventrogluteal as per written order of Bessy Church.  Pt tolerated procedure well and left clinic per self without difficulty.

## 2024-06-10 NOTE — PROGRESS NOTES
Chief Complaint: ***    HPI:        Chief Complaint:   Recurrent urinary tract infections     HPI:   09/05/2023  Patient is familiar to our clinic secondary to recurrent urinary tract infections.  Patient recently completed antibiotics secondary to Klebsiella positive urine culture.  Patient states that over the last 3 days she is had slight dysuria.  Denies pelvic or flank pain.  Denies gross hematuria or fever.  Urine in clinic indicates leukocytes and nitrates, all other parameters are negative.  Patient has an upcoming appointment with Infectious Disease secondary to 9 positive urine cultures in the past 9 months despite nightly antibiotics.  08/23/2023  Patient is a 52-year-old female that has been followed by this clinic for recurrent E coli cystitis.  Was recently seen by urgent care for possible positive urine culture, and empirically treated with Cipro.  Patient is presenting as a follow-up.  Urine in clinic is negative.  Patient has recurrent E coli cystitis despite prophylactic nightly antibiotics.  Patient has chronic constipation that requires bowel prep for elimination.  Is followed by GI.  Has an upcoming appointment with infectious disease.  07/13/2023  Patient has been followed by this clinic for recurrent E coli cystitis.  Was recently prescribed nightly antibiotics, Macrobid, for 3 months.  Unfortunately, patient reports dysuria and pelvic pain for 2 days.  Urine in clinic indicates nitrates leukocytes and trace blood, all other parameters are negative.  Denies gross hematuria or fever.  Patient has a long history of chronic constipation, defecating every 10-12 days, requiring bowel prep for elimination.  Is followed by GI.  06/23/2023  Patient is a 52-year-old female that is presenting as a follow-up to recurrent urinary tract infections.  Patient recently was treated for E coli cystitis.  Urine in clinic is negative and PVR is 13 mL.  Patient is currently asymptomatic.  Had a recent CT abdomen  pelvis that was negative for renal abnormalities.  Patient states that she has severe constipation and only defecates every 10-12 days.  Reports that E coli UTIs are associated with her bowel prep for defecation.  2023  Patient is a 52-year-old female that is presenting with a history of recurrent urinary tract infections and was recently seen in ED for pyelonephritis.  Patient states that she was prescribed Cipro, twice, and is currently asymptomatic.  Urine in clinic is negative and PVR is 0 mL.  No history of gross hematuria or renal stones.  Patient states that she has chronic constipation, only has 2-3 bowel movements a month.  Reports that urine cultures always indicate E coli.        Allergies:  Patient has no known allergies.    Medications:  has a current medication list which includes the following prescription(s): amitriptyline, clonazepam, eletriptan, escitalopram oxalate, lisdexamfetamine, lubiprostone, promethazine, quetiapine, and valacyclovir.    Review of Systems:  General: No fever, chills, fatigability, or weight loss.  Skin: No rashes, itching, or changes in color or texture of skin.  Chest: Denies QUINN, cyanosis, wheezing, cough, and sputum production.  Abdomen: Appetite fine. No weight loss. Denies diarrhea, abdominal pain, hematemesis, or blood in stool.  Musculoskeletal: No joint stiffness or swelling. Denies back pain.  : As above.  All other review of systems negative.    PMH:   has a past medical history of ADD (attention deficit disorder), Hypothyroidism, Irritable bowel syndrome (IBS), Migraine, Post Traumatic Stress Disorder, and Therapeutic Drug Monitoring.    PSH:   has a past surgical history that includes  section; Knee surgery (Left); Appendectomy; Breast surgery; Hysterectomy (); Inguinal hernia repair (Left, 2017); Colonoscopy (2019); Augmentation of breast; and Anorectal manometry (N/A, 2024).    FamHx: family history includes Breast cancer in  her paternal aunt, paternal aunt and another family member; COPD in her maternal grandmother; Cancer in her maternal grandfather and sister; Colon cancer in her mother; Diabetes in her mother and paternal grandmother; Diverticulitis in her paternal grandmother; Emphysema in her paternal grandfather; Hypothyroidism in her maternal grandmother; Parkinsonism in her paternal grandmother; Prostate cancer in her father and paternal grandfather; Rheum arthritis in her maternal grandmother.    SocHx:  reports that she has never smoked. She has never been exposed to tobacco smoke. She has never used smokeless tobacco. She reports that she does not drink alcohol and does not use drugs.      Physical Exam:  General: A&Ox3, no apparent distress, no deformities  Neck: No masses, normal thyroid  Lungs: normal inspiration, no use of accessory muscles  Heart: normal pulse, no arrhythmias  Abdomen: Soft, NT, ND, no masses, no hernias, no hepatosplenomegaly  Lymphatic: Neck and groin nodes negative  Skin: The skin is warm and dry. No jaundice.  Ext: No c/c/e.    Labs/Studies:     Impression/Plan:

## 2024-06-11 NOTE — PROVATION PATIENT INSTRUCTIONS
Discharge Summary/Instructions after an Endoscopic Procedure  Patient Name: Tamiko Bettencourt  Patient MRN: [id number]  Patient YOB: 1970 Friday, June 7, 2024 Yumiko Galvez MD  Dear patient,  As a result of recent federal legislation (The Federal Cures Act), you may   receive lab or pathology results from your procedure in your [facility]   account before your physician is able to contact you. Your physician or   their representative will relay the results to you with their   recommendations at their soonest availability.  Thank you,  RESTRICTIONS:  During your procedure today, you received medications for sedation.  These   medications may affect your judgment, balance and coordination.  Therefore,   for 24 hours, you have the following restrictions:   - DO NOT drive a car, operate machinery, make legal/financial decisions,   sign important papers or drink alcohol.    ACTIVITY:  Today: no heavy lifting, straining or running due to procedural   sedation/anesthesia.  The following day: return to full activity including work.  DIET:  Eat and drink normally unless instructed otherwise.     TREATMENT FOR COMMON SIDE EFFECTS:  - Mild abdominal pain, nausea, belching, bloating or excessive gas:  rest,   eat lightly and use a heating pad.  - Sore Throat: treat with throat lozenges and/or gargle with warm salt   water.  - Because air was used during the procedure, expelling large amounts of air   from your rectum or belching is normal.  - If a bowel prep was taken, you may not have a bowel movement for 1-3 days.    This is normal.  SYMPTOMS TO WATCH FOR AND REPORT TO YOUR PHYSICIAN:  1. Abdominal pain or bloating, other than gas cramps.  2. Chest pain.  3. Back pain.  4. Signs of infection such as: chills or fever occurring within 24 hours   after the procedure.  5. Rectal bleeding, which would show as bright red, maroon, or black stools.   (A tablespoon of blood from the rectum is not serious, especially if    hemorrhoids are present.)  6. Vomiting.  7. Weakness or dizziness.  GO DIRECTLY TO THE NEAREST EMERGENCY ROOM IF YOU HAVE ANY OF THE FOLLOWING:      Difficulty breathing              Chills and/or fever over 101 F   Persistent vomiting and/or vomiting blood   Severe abdominal pain   Severe chest pain   Black, tarry stools   Bleeding- more than one tablespoon   Any other symptom or condition that you feel may need urgent attention  Your doctor recommends these additional instructions:  If any biopsies were taken, your doctors clinic will contact you in 1 to 2   weeks with any results.  - Begin biofeedback training.   - Begin anal sphincter retraining.  For questions, problems or results please call your physician Yumiko Galvez MD at Work:  [phone]  If you have any questions about the above instructions, call the GI   department at [phone] or call the endoscopy unit at [phone]   from 7am until 3 pm.  [facility], EMERGENCY ROOM PHONE NUMBER:   [phone]  IF A COMPLICATION OR EMERGENCY SITUATION ARISES AND YOU ARE UNABLE TO REACH   YOUR PHYSICIAN - GO DIRECTLY TO THE EMERGENCY ROOM.  I have read or have had read to me these discharge instructions for my   procedure and have received a written copy.  I understand these   instructions and will follow-up with my physician if I have any questions.     __________________________________       _____________________________________  Nurse Signature                                          Patient/Designated   Responsible Party Signature  Yumiko Galvez MD  6/11/2024 2:31:34 PM  This report has been verified and signed electronically.  Dear patient,  As a result of recent federal legislation (The Federal Cures Act), you may   receive lab or pathology results from your procedure in your [facility]   account before your physician is able to contact you. Your physician or   their representative will relay the results to you  with their   recommendations at their soonest availability.  Thank you,  PROVATION

## 2024-06-12 ENCOUNTER — TELEPHONE (OUTPATIENT)
Dept: UROLOGY | Facility: CLINIC | Age: 54
End: 2024-06-12
Payer: COMMERCIAL

## 2024-06-12 ENCOUNTER — PATIENT MESSAGE (OUTPATIENT)
Dept: UROLOGY | Facility: CLINIC | Age: 54
End: 2024-06-12
Payer: COMMERCIAL

## 2024-06-12 DIAGNOSIS — K58.1 IRRITABLE BOWEL SYNDROME WITH CONSTIPATION: Primary | ICD-10-CM

## 2024-06-12 NOTE — TELEPHONE ENCOUNTER
Informed patient of urine culture results, pt verbalized understanding.       ----- Message from Bessy Church NP sent at 6/12/2024  9:33 AM CDT -----    Please call patient and inform her that preliminary urine culture does indicate an infection and she needs to remain on Levaquin.  I will contact her with final urine culture results.

## 2024-06-13 ENCOUNTER — HOSPITAL ENCOUNTER (EMERGENCY)
Facility: HOSPITAL | Age: 54
Discharge: HOME | End: 2024-06-14
Attending: EMERGENCY MEDICINE
Payer: COMMERCIAL

## 2024-06-13 ENCOUNTER — PATIENT MESSAGE (OUTPATIENT)
Dept: UROLOGY | Facility: CLINIC | Age: 54
End: 2024-06-13
Payer: COMMERCIAL

## 2024-06-13 DIAGNOSIS — N12 PYELONEPHRITIS: ICD-10-CM

## 2024-06-13 DIAGNOSIS — R10.9 LEFT FLANK PAIN: Primary | ICD-10-CM

## 2024-06-13 DIAGNOSIS — K59.09 CHRONIC CONSTIPATION: ICD-10-CM

## 2024-06-13 LAB
ALBUMIN SERPL BCP-MCNC: 4.2 G/DL (ref 3.5–5.2)
ALP SERPL-CCNC: 68 U/L (ref 55–135)
ALT SERPL W/O P-5'-P-CCNC: 21 U/L (ref 10–44)
ANION GAP SERPL CALC-SCNC: 9 MMOL/L (ref 8–16)
AST SERPL-CCNC: 27 U/L (ref 10–40)
BACTERIA #/AREA URNS HPF: ABNORMAL /HPF
BACTERIA #/AREA URNS HPF: ABNORMAL /HPF
BACTERIA UR CULT: ABNORMAL
BASOPHILS # BLD AUTO: 0.02 K/UL (ref 0–0.2)
BASOPHILS NFR BLD: 0.4 % (ref 0–1.9)
BILIRUB SERPL-MCNC: 0.4 MG/DL (ref 0.1–1)
BILIRUB UR QL STRIP: NEGATIVE
BILIRUB UR QL STRIP: NEGATIVE
BUN SERPL-MCNC: 12 MG/DL (ref 6–20)
CALCIUM SERPL-MCNC: 9.7 MG/DL (ref 8.7–10.5)
CHLORIDE SERPL-SCNC: 106 MMOL/L (ref 95–110)
CLARITY UR: CLEAR
CLARITY UR: CLEAR
CO2 SERPL-SCNC: 24 MMOL/L (ref 23–29)
COLOR UR: YELLOW
COLOR UR: YELLOW
CREAT SERPL-MCNC: 1 MG/DL (ref 0.5–1.4)
DIFFERENTIAL METHOD BLD: ABNORMAL
EOSINOPHIL # BLD AUTO: 0.2 K/UL (ref 0–0.5)
EOSINOPHIL NFR BLD: 3.6 % (ref 0–8)
ERYTHROCYTE [DISTWIDTH] IN BLOOD BY AUTOMATED COUNT: 13 % (ref 11.5–14.5)
EST. GFR  (NO RACE VARIABLE): >60 ML/MIN/1.73 M^2
GLUCOSE SERPL-MCNC: 86 MG/DL (ref 70–110)
GLUCOSE UR QL STRIP: NEGATIVE
GLUCOSE UR QL STRIP: NEGATIVE
HCT VFR BLD AUTO: 38.8 % (ref 37–48.5)
HCV AB SERPL QL IA: NEGATIVE
HEP C VIRUS HOLD SPECIMEN: NORMAL
HGB BLD-MCNC: 12.3 G/DL (ref 12–16)
HGB UR QL STRIP: NEGATIVE
HGB UR QL STRIP: NEGATIVE
HIV 1+2 AB+HIV1 P24 AG SERPL QL IA: NEGATIVE
HYALINE CASTS #/AREA URNS LPF: 1 /LPF
IMM GRANULOCYTES # BLD AUTO: 0 K/UL (ref 0–0.04)
IMM GRANULOCYTES NFR BLD AUTO: 0 % (ref 0–0.5)
KETONES UR QL STRIP: NEGATIVE
KETONES UR QL STRIP: NEGATIVE
LEUKOCYTE ESTERASE UR QL STRIP: ABNORMAL
LEUKOCYTE ESTERASE UR QL STRIP: ABNORMAL
LIPASE SERPL-CCNC: 17 U/L (ref 4–60)
LYMPHOCYTES # BLD AUTO: 1.8 K/UL (ref 1–4.8)
LYMPHOCYTES NFR BLD: 36 % (ref 18–48)
MCH RBC QN AUTO: 28.3 PG (ref 27–31)
MCHC RBC AUTO-ENTMCNC: 31.7 G/DL (ref 32–36)
MCV RBC AUTO: 89 FL (ref 82–98)
MICROSCOPIC COMMENT: ABNORMAL
MICROSCOPIC COMMENT: ABNORMAL
MONOCYTES # BLD AUTO: 0.5 K/UL (ref 0.3–1)
MONOCYTES NFR BLD: 10.7 % (ref 4–15)
NEUTROPHILS # BLD AUTO: 2.5 K/UL (ref 1.8–7.7)
NEUTROPHILS NFR BLD: 49.3 % (ref 38–73)
NITRITE UR QL STRIP: NEGATIVE
NITRITE UR QL STRIP: NEGATIVE
NRBC BLD-RTO: 0 /100 WBC
PH UR STRIP: 6 [PH] (ref 5–8)
PH UR STRIP: 7 [PH] (ref 5–8)
PLATELET # BLD AUTO: 169 K/UL (ref 150–450)
PMV BLD AUTO: 9.8 FL (ref 9.2–12.9)
POTASSIUM SERPL-SCNC: 4.1 MMOL/L (ref 3.5–5.1)
PROT SERPL-MCNC: 7.2 G/DL (ref 6–8.4)
PROT UR QL STRIP: NEGATIVE
PROT UR QL STRIP: NEGATIVE
RBC # BLD AUTO: 4.35 M/UL (ref 4–5.4)
RBC #/AREA URNS HPF: 1 /HPF (ref 0–4)
RBC #/AREA URNS HPF: 1 /HPF (ref 0–4)
SODIUM SERPL-SCNC: 139 MMOL/L (ref 136–145)
SP GR UR STRIP: 1.01 (ref 1–1.03)
SP GR UR STRIP: 1.01 (ref 1–1.03)
SQUAMOUS #/AREA URNS HPF: 7 /HPF
SQUAMOUS #/AREA URNS HPF: 9 /HPF
URN SPEC COLLECT METH UR: ABNORMAL
URN SPEC COLLECT METH UR: ABNORMAL
UROBILINOGEN UR STRIP-ACNC: NEGATIVE EU/DL
UROBILINOGEN UR STRIP-ACNC: NEGATIVE EU/DL
WBC # BLD AUTO: 5.05 K/UL (ref 3.9–12.7)
WBC #/AREA URNS HPF: 7 /HPF (ref 0–5)
WBC #/AREA URNS HPF: 9 /HPF (ref 0–5)

## 2024-06-13 PROCEDURE — 85025 COMPLETE CBC W/AUTO DIFF WBC: CPT | Performed by: PHYSICIAN ASSISTANT

## 2024-06-13 PROCEDURE — 87389 HIV-1 AG W/HIV-1&-2 AB AG IA: CPT | Performed by: EMERGENCY MEDICINE

## 2024-06-13 PROCEDURE — 86803 HEPATITIS C AB TEST: CPT | Performed by: EMERGENCY MEDICINE

## 2024-06-13 PROCEDURE — 83690 ASSAY OF LIPASE: CPT | Performed by: PHYSICIAN ASSISTANT

## 2024-06-13 PROCEDURE — 81000 URINALYSIS NONAUTO W/SCOPE: CPT | Performed by: EMERGENCY MEDICINE

## 2024-06-13 PROCEDURE — 81000 URINALYSIS NONAUTO W/SCOPE: CPT | Mod: 91 | Performed by: PHYSICIAN ASSISTANT

## 2024-06-13 PROCEDURE — 99285 EMERGENCY DEPT VISIT HI MDM: CPT | Mod: 25

## 2024-06-13 PROCEDURE — 96374 THER/PROPH/DIAG INJ IV PUSH: CPT | Mod: 59

## 2024-06-13 PROCEDURE — 80053 COMPREHEN METABOLIC PANEL: CPT | Performed by: PHYSICIAN ASSISTANT

## 2024-06-13 RX ORDER — KETOROLAC TROMETHAMINE 30 MG/ML
15 INJECTION, SOLUTION INTRAMUSCULAR; INTRAVENOUS
Status: COMPLETED | OUTPATIENT
Start: 2024-06-14 | End: 2024-06-13

## 2024-06-13 RX ADMIN — IOHEXOL 100 ML: 350 INJECTION, SOLUTION INTRAVENOUS at 11:06

## 2024-06-13 RX ADMIN — KETOROLAC TROMETHAMINE 15 MG: 30 INJECTION, SOLUTION INTRAMUSCULAR at 11:06

## 2024-06-14 VITALS
TEMPERATURE: 98 F | OXYGEN SATURATION: 100 % | WEIGHT: 151.25 LBS | SYSTOLIC BLOOD PRESSURE: 113 MMHG | BODY MASS INDEX: 22.4 KG/M2 | RESPIRATION RATE: 14 BRPM | DIASTOLIC BLOOD PRESSURE: 70 MMHG | HEIGHT: 69 IN | HEART RATE: 73 BPM

## 2024-06-14 PROCEDURE — 96376 TX/PRO/DX INJ SAME DRUG ADON: CPT

## 2024-06-14 PROCEDURE — 63600175 PHARM REV CODE 636 W HCPCS: Performed by: EMERGENCY MEDICINE

## 2024-06-14 PROCEDURE — 82150 ASSAY OF AMYLASE: CPT | Performed by: EMERGENCY MEDICINE

## 2024-06-14 PROCEDURE — 87086 URINE CULTURE/COLONY COUNT: CPT | Performed by: EMERGENCY MEDICINE

## 2024-06-14 PROCEDURE — 87040 BLOOD CULTURE FOR BACTERIA: CPT | Performed by: EMERGENCY MEDICINE

## 2024-06-14 PROCEDURE — 25500020 PHARM REV CODE 255: Performed by: EMERGENCY MEDICINE

## 2024-06-14 RX ORDER — KETOROLAC TROMETHAMINE 30 MG/ML
15 INJECTION, SOLUTION INTRAMUSCULAR; INTRAVENOUS
Status: COMPLETED | OUTPATIENT
Start: 2024-06-14 | End: 2024-06-14

## 2024-06-14 RX ADMIN — KETOROLAC TROMETHAMINE 15 MG: 30 INJECTION, SOLUTION INTRAMUSCULAR at 01:06

## 2024-06-14 NOTE — ED PROVIDER NOTES
SCRIBE #1 NOTE: I, Ayo Daniel, am scribing for, and in the presence of, Javed Nunez MD. I have scribed the entire note.       History     Chief Complaint   Patient presents with    Flank Pain     Right Flank Pain/Urgency/Frequency;currently being treated for UTI that's not improving with Levaquin/Rocephin injection. +Fever;took APAP 1 hr PTA     Review of patient's allergies indicates:  No Known Allergies      History of Present Illness     HPI    6/13/2024, 11:41 PM  History obtained from the patient      History of Present Illness: Tamiko Bettencourt is a 53 y.o. female patient with a PMHx of UTIs, hypothyroidism, ADD, migraines, IBS, and PTSD who presents to the Emergency Department for evaluation of L flank pain which onset gradually over the past 4 days. Pt saw Bessy Church NP (Urology) on 6/10/24 where she was dx with a UTI with E. coli. Pt was given Levaquin and a Rocephin injection IM, which pt reports has not resolved her sxs. Symptoms are worsening today and moderate in severity. Pain is exacerbated upon palpation to L kidney. Associated sxs include L flank pain, fever, urgency, frequency, constipation, left lower back pain, and nausea. Patient denies any vomiting and all other sxs at this time. Pt has also seen a gastroenterologist due to constipation, as her last BM was 5 days ago. Pt took APAP 1 hr pta. No further complaints or concerns at this time.     External notes and labs reviewed: Reviewed note from Bessy Church NP (Urology) 6/10/24 showing treatment for UTI.  Reviewed labs showing urine E. coli is sensitive to Levofloxacin and is 50,000 - 99,999 cfu/ml.     Arrival mode: Personal vehicle    PCP: Brett Graves II, MD        Past Medical History:  Past Medical History:   Diagnosis Date    ADD (attention deficit disorder)     Hypothyroidism     Irritable bowel syndrome (IBS)     Migraine     Post Traumatic Stress Disorder     Her Son Was A Marine Who Was Killed While Serving In  [facility]    Therapeutic Drug Monitoring        Past Surgical History:  Past Surgical History:   Procedure Laterality Date    ANORECTAL MANOMETRY N/A 2024    Procedure: MANOMETRY, ANORECTAL;  Surgeon: Kamini Ibarra, First Available;  Location: [facility];  Service: Endoscopy;  Laterality: N/A;    APPENDECTOMY      AUGMENTATION OF BREAST      BREAST SURGERY      Augmentation      SECTION      COLONOSCOPY  2019    Dr. Wong    HYSTERECTOMY  2010    partial    INGUINAL HERNIA REPAIR Left 2017    KNEE SURGERY Left          Family History:  Family History   Problem Relation Name Age of Onset    Diabetes Mother      Colon cancer Mother      Prostate cancer Father      Cancer Sister      Breast cancer Paternal Aunt          age unknown    Breast cancer Paternal Aunt          age unknown    COPD Maternal Grandmother      Rheum arthritis Maternal Grandmother      Hypothyroidism Maternal Grandmother      Cancer Maternal Grandfather      Diabetes Paternal Grandmother      Diverticulitis Paternal Grandmother      Parkinsonism Paternal Grandmother      Prostate cancer Paternal Grandfather      Emphysema Paternal Grandfather          non-smoker     Breast cancer Other 1st cousin         age unknown       Social History:  Social History     Tobacco Use    Smoking status: Never     Passive exposure: Never    Smokeless tobacco: Never   Substance and Sexual Activity    Alcohol use: No    Drug use: No    Sexual activity: Yes     Partners: Male        Review of Systems     Review of Systems   Constitutional:  Positive for fever.   HENT:  Negative for sore throat.    Respiratory:  Negative for shortness of breath.    Cardiovascular:  Negative for chest pain.   Gastrointestinal:  Positive for constipation and nausea. Negative for vomiting.   Genitourinary:  Positive for flank pain (L), frequency and urgency. Negative for dysuria.   Musculoskeletal:  Positive for back pain (L lower lumbar).   Skin:  Negative for  "rash.   Neurological:  Negative for weakness.   Hematological:  Does not bruise/bleed easily.   All other systems reviewed and are negative.     Physical Exam     Initial Vitals [06/13/24 1931]   BP Pulse Resp Temp SpO2   124/73 99 14 98.4 °F (36.9 °C) 98 %      MAP       --          Physical Exam  Nursing Notes and Vital Signs Reviewed.  Constitutional: Patient is in no acute distress. Well-developed and well-nourished.  Head: Atraumatic. Normocephalic.  Eyes: PERRL. EOM intact. Conjunctivae are not pale. No scleral icterus.  ENT: Mucous membranes are moist. Oropharynx is clear and symmetric.    Neck: Supple. Full ROM. No lymphadenopathy.  Cardiovascular: Regular rate. Regular rhythm. No murmurs, rubs, or gallops. Distal pulses are 2+ and symmetric.  Pulmonary/Chest: No respiratory distress. Clear to auscultation bilaterally. No wheezing or rales.  Abdominal: Soft and non-distended.  There is no tenderness.  No rebound, guarding, or rigidity. Good bowel sounds.  Genitourinary: Left CVA tenderness to percussion. L lower lumbar spinal tenderness.   Musculoskeletal: Moves all extremities. No obvious deformities. No edema. No calf tenderness.  Skin: Warm and dry.  Neurological:  Alert, awake, and appropriate.  Normal speech.  No acute focal neurological deficits are appreciated.  Psychiatric: Normal affect. Good eye contact. Appropriate in content.     ED Course   Procedures  ED Vital Signs:  Vitals:    06/13/24 1931 06/13/24 2232 06/13/24 2302 06/13/24 2332   BP: 124/73 113/71 120/74 117/79   Pulse: 99 78 77 84   Resp: 14      Temp: 98.4 °F (36.9 °C)      TempSrc: Oral      SpO2: 98% 100% 100% 100%   Weight: 68.6 kg (151 lb 3.8 oz)      Height: 5' 9" (1.753 m)       06/14/24 0102 06/14/24 0130   BP: 113/70    Pulse: 75 73   Resp:     Temp:     TempSrc:     SpO2: 100% 100%   Weight:     Height:         Abnormal Lab Results:  Labs Reviewed   URINALYSIS, REFLEX TO URINE CULTURE - Abnormal; Notable for the following " components:       Result Value    Leukocytes, UA 2+ (*)     All other components within normal limits    Narrative:     Specimen Source->Urine   URINALYSIS MICROSCOPIC - Abnormal; Notable for the following components:    WBC, UA 9 (*)     All other components within normal limits    Narrative:     Specimen Source->Urine   CBC W/ AUTO DIFFERENTIAL - Abnormal; Notable for the following components:    MCHC 31.7 (*)     All other components within normal limits   URINALYSIS, REFLEX TO URINE CULTURE - Abnormal; Notable for the following components:    Leukocytes, UA 1+ (*)     All other components within normal limits    Narrative:     Specimen Source->Urine   URINALYSIS MICROSCOPIC - Abnormal; Notable for the following components:    WBC, UA 7 (*)     All other components within normal limits    Narrative:     Specimen Source->Urine   CULTURE, BLOOD   CULTURE, BLOOD   CULTURE, URINE   HIV 1 / 2 ANTIBODY    Narrative:     Release to patient->Immediate   HEPATITIS C ANTIBODY    Narrative:     Release to patient->Immediate   HEP C VIRUS HOLD SPECIMEN    Narrative:     Release to patient->Immediate   COMPREHENSIVE METABOLIC PANEL   LIPASE   AMYLASE        All Lab Results:  Results for orders placed or performed during the hospital encounter of 06/13/24   HIV 1/2 Ag/Ab (4th Gen)   Result Value Ref Range    HIV 1/2 Ag/Ab Negative Negative   Hepatitis C Antibody   Result Value Ref Range    Hepatitis C Ab Negative Negative   HCV Virus Hold Specimen   Result Value Ref Range    HEP C Virus Hold Specimen Hold for HCV sendout    Urinalysis, Reflex to Urine Culture Urine, Clean Catch    Specimen: Urine   Result Value Ref Range    Specimen UA Urine, Clean Catch     Color, UA Yellow Yellow, Straw, Nallely    Appearance, UA Clear Clear    pH, UA 7.0 5.0 - 8.0    Specific Gravity, UA 1.010 1.005 - 1.030    Protein, UA Negative Negative    Glucose, UA Negative Negative    Ketones, UA Negative Negative    Bilirubin (UA) Negative Negative     Occult Blood UA Negative Negative    Nitrite, UA Negative Negative    Urobilinogen, UA Negative <2.0 EU/dL    Leukocytes, UA 2+ (A) Negative   Urinalysis Microscopic   Result Value Ref Range    RBC, UA 1 0 - 4 /hpf    WBC, UA 9 (H) 0 - 5 /hpf    Bacteria Rare None-Occ /hpf    Squam Epithel, UA 7 /hpf    Hyaline Casts, UA 1 0-1/lpf /lpf    Microscopic Comment SEE COMMENT    CBC Auto Differential   Result Value Ref Range    WBC 5.05 3.90 - 12.70 K/uL    RBC 4.35 4.00 - 5.40 M/uL    Hemoglobin 12.3 12.0 - 16.0 g/dL    Hematocrit 38.8 37.0 - 48.5 %    MCV 89 82 - 98 fL    MCH 28.3 27.0 - 31.0 pg    MCHC 31.7 (L) 32.0 - 36.0 g/dL    RDW 13.0 11.5 - 14.5 %    Platelets 169 150 - 450 K/uL    MPV 9.8 9.2 - 12.9 fL    Immature Granulocytes 0.0 0.0 - 0.5 %    Gran # (ANC) 2.5 1.8 - 7.7 K/uL    Immature Grans (Abs) 0.00 0.00 - 0.04 K/uL    Lymph # 1.8 1.0 - 4.8 K/uL    Mono # 0.5 0.3 - 1.0 K/uL    Eos # 0.2 0.0 - 0.5 K/uL    Baso # 0.02 0.00 - 0.20 K/uL    nRBC 0 0 /100 WBC    Gran % 49.3 38.0 - 73.0 %    Lymph % 36.0 18.0 - 48.0 %    Mono % 10.7 4.0 - 15.0 %    Eosinophil % 3.6 0.0 - 8.0 %    Basophil % 0.4 0.0 - 1.9 %    Differential Method Automated    Comprehensive Metabolic Panel   Result Value Ref Range    Sodium 139 136 - 145 mmol/L    Potassium 4.1 3.5 - 5.1 mmol/L    Chloride 106 95 - 110 mmol/L    CO2 24 23 - 29 mmol/L    Glucose 86 70 - 110 mg/dL    BUN 12 6 - 20 mg/dL    Creatinine 1.0 0.5 - 1.4 mg/dL    Calcium 9.7 8.7 - 10.5 mg/dL    Total Protein 7.2 6.0 - 8.4 g/dL    Albumin 4.2 3.5 - 5.2 g/dL    Total Bilirubin 0.4 0.1 - 1.0 mg/dL    Alkaline Phosphatase 68 55 - 135 U/L    AST 27 10 - 40 U/L    ALT 21 10 - 44 U/L    eGFR >60 >60 mL/min/1.73 m^2    Anion Gap 9 8 - 16 mmol/L   Urinalysis, Reflex to Urine Culture Urine, Clean Catch    Specimen: Urine   Result Value Ref Range    Specimen UA Urine, Clean Catch     Color, UA Yellow Yellow, Straw, Nallely    Appearance, UA Clear Clear    pH, UA 6.0 5.0 - 8.0     Specific Gravity, UA 1.010 1.005 - 1.030    Protein, UA Negative Negative    Glucose, UA Negative Negative    Ketones, UA Negative Negative    Bilirubin (UA) Negative Negative    Occult Blood UA Negative Negative    Nitrite, UA Negative Negative    Urobilinogen, UA Negative <2.0 EU/dL    Leukocytes, UA 1+ (A) Negative   Lipase   Result Value Ref Range    Lipase 17 4 - 60 U/L   Urinalysis Microscopic   Result Value Ref Range    RBC, UA 1 0 - 4 /hpf    WBC, UA 7 (H) 0 - 5 /hpf    Bacteria Rare None-Occ /hpf    Squam Epithel, UA 9 /hpf    Microscopic Comment SEE COMMENT          Imaging Results:  Imaging Results              CT Abdomen Pelvis With IV Contrast NO Oral Contrast (Final result)  Result time 06/14/24 00:32:07      Final result by Jason Raman MD (06/14/24 00:32:07)                   Impression:      Symmetric enhancement of the kidneys without evidence of hydronephrosis.  No significant bladder wall thickening present, although small focus of air is present within the bladder lumen.  This may relate to recent catheterization, however clinical correlation is advised.    Moderate volume retained stool throughout the right hemicolon suggestive of constipation.    Cholecystectomy.  Mild intra and extrahepatic biliary ductal dilatation which may relate to post cholecystectomy status.    Additional findings as above.      Electronically signed by: Jason Raman MD  Date:    06/14/2024  Time:    00:32               Narrative:    EXAMINATION:  CT ABDOMEN PELVIS WITH IV CONTRAST    CLINICAL HISTORY:  Flank pain, kidney stone suspected;    TECHNIQUE:  Low dose axial images, sagittal and coronal reformations were obtained from the lung bases to the pubic symphysis following the IV administration of 100 mL of Omnipaque 350 .  Oral contrast was not given.    COMPARISON:  CT 06/16/2023    FINDINGS:  The lung bases are unremarkable.  There is no pleural fluid present.  There are partially visualized bilateral  breast implants present.  No significant pericardial effusion.    The liver is normal in size and attenuation with no focal hepatic abnormality.  Gallbladder is surgically absent.  There is mild intra and extrahepatic biliary ductal dilatation which may relate to post cholecystectomy status.  The portal vein is patent.    The stomach, spleen, pancreas, and adrenal glands are unremarkable.    The kidneys are normal in size and location and enhance symmetrically.  There is no evidence of hydronephrosis. Urinary bladder is mildly distended without significant wall thickening appreciated.  There is a small volume of air within the urinary bladder lumen.  Uterus appears to be surgically absent.  No significant free fluid in the pelvis.    The abdominal aorta is normal in course and caliber without significant atherosclerotic calcifications.    The visualized loops of small and large bowel show no evidence of obstruction or inflammation. There is a moderate volume of retained stool throughout the right hemicolon suggesting constipation.  The appendix is not definitively visualized, however no focal inflammatory change is seen at its expected location.  There is no ascites, free fluid, or intraperitoneal free air. There are scattered shotty small mesenteric and retroperitoneal lymph nodes.    There are degenerative changes of the lumbar spine most pronounced at L3-L4.  The extraperitoneal soft tissues are unremarkable.                                             The Emergency Provider reviewed the vital signs and test results, which are outlined above.     ED Discussion       1:15 AM: Reassessed pt at this time.  Pt states her condition has improved at this time. Discussed with pt all pertinent ED information and results. Discussed pt dx and plan of tx. Gave pt all f/u and return to the ED instructions. All questions and concerns were addressed at this time. Pt expresses understanding of information and instructions, and  is comfortable with plan to discharge. Pt is stable for discharge.    I discussed with patient and/or family/caretaker that evaluation in the ED does not suggest any emergent or life threatening medical conditions requiring immediate intervention beyond what was provided in the ED, and I believe patient is safe for discharge.  Regardless, an unremarkable evaluation in the ED does not preclude the development or presence of a serious of life threatening condition. As such, patient was instructed to return immediately for any worsening or change in current symptoms.      ED Course as of 06/14/24 0331   Fri Jun 14, 2024   0104 The patient is feeling better at this time.  She would like to go home.  Patient does have access to her urologist and believes she can get in touch in the morning for further antibiotic recommendations if warranted.  Patient's urinalysis here has less than 10 white blood cells, which is improved from her prior urinalysis.  We will send for urine culture.  Patient additionally was told to return to the emergency department with any worsening symptoms, new symptoms, continued fever.  She was instructed to finish her antibiotic course. [BA]      ED Course User Index  [BA] Javed Nunez MD     Medical Decision Making  DDx includes UTI, Pyelonephritis, Sepsis, MSK pain,  Constipation, IRINEO    Amount and/or Complexity of Data Reviewed  External Data Reviewed: labs and notes.     Details: Reviewed note from Bessy Church NP (Urology) 6/10/24 showing treatment for UTI.  Reviewed labs showing urine E. coli is sensitive to Levofloxacin and is 50,000 - 99,999 cfu/ml.       Labs: ordered. Decision-making details documented in ED Course.  Radiology: ordered. Decision-making details documented in ED Course.    Risk  Prescription drug management.                ED Medication(s):  Medications   iohexoL (OMNIPAQUE 350) injection 100 mL (100 mLs Intravenous Given 6/13/24 2602)   ketorolac injection 15 mg (15 mg  Intravenous Given 6/13/24 3030)   ketorolac injection 15 mg (15 mg Intravenous Given 6/14/24 0134)       Discharge Medication List as of 6/14/2024  1:14 AM           Follow-up Information       Bessy Church NP. Call today.    Specialties: Urology, Gynecology, Obstetrics  Why: For re-evaluation and further treatment  Contact information:  [address] [place] [address]  [phone]               [facility] - Emergency Dept.. Go today.    Specialty: Emergency Medicine  Why: If symptoms worsen, For re-evaluation and further treatment, As needed  Contact information:  [address] [facility] [address] [phone]                               Scribe Attestation:   Scribe #1: I performed the above scribed service and the documentation accurately describes the services I performed. I attest to the accuracy of the note.     Attending:   Physician Attestation Statement for Scribe #1: I, Javed Nunez MD, personally performed the services described in this documentation, as scribed by Ayo Sanchez, in my presence, and it is both accurate and complete.           Clinical Impression       ICD-10-CM ICD-9-CM   1. Left flank pain  R10.9 789.09   2. Pyelonephritis  N12 590.80   3. Chronic constipation  K59.09 564.00       Disposition:   Disposition: Discharged  Condition: Stable         Javed Nunez MD  06/14/24 0334

## 2024-06-14 NOTE — FIRST PROVIDER EVALUATION
" Emergency Department TeleTriage Encounter Note      CHIEF COMPLAINT    Chief Complaint   Patient presents with    Flank Pain     Right Flank Pain/Urgency/Frequency;currently being treated for UTI that's not improving with Levaquin/Rocephin injection. +Fever;took APAP 1 hr PTA       VITAL SIGNS   Initial Vitals [06/13/24 1931]   BP Pulse Resp Temp SpO2   124/73 99 14 98.4 °F (36.9 °C) 98 %      MAP       --            ALLERGIES    Review of patient's allergies indicates:  No Known Allergies    PROVIDER TRIAGE NOTE  52 yo F to the ED with symptoms of a UTI since Monday, not improving with ABX, received rocephin and levaquin.       ORDERS  Labs Reviewed   URINALYSIS, REFLEX TO URINE CULTURE - Abnormal; Notable for the following components:       Result Value    Leukocytes, UA 2+ (*)     All other components within normal limits    Narrative:     Specimen Source->Urine   URINALYSIS MICROSCOPIC - Abnormal; Notable for the following components:    WBC, UA 9 (*)     All other components within normal limits    Narrative:     Specimen Source->Urine   HIV 1 / 2 ANTIBODY   HEPATITIS C ANTIBODY   HEP C VIRUS HOLD SPECIMEN       ED Orders (720h ago, onward)      Start Ordered     Status Ordering Provider    06/13/24 1948 06/13/24 1947  Urinalysis, Reflex to Urine Culture Urine, Clean Catch  STAT         Final result JOSEP VIDES    06/13/24 1947 06/13/24 1947  Urinalysis Microscopic  Once         Final result JOSEP VIDES.    06/13/24 1933 06/13/24 1932  HIV 1/2 Ag/Ab (4th Gen)  STAT         Acknowledged JOSEP VIDES.    06/13/24 1933 06/13/24 1932  Hepatitis C Antibody  STAT        Placed in "And" Linked Group    Acknowledged JOSEP VIDES.    06/13/24 1933 06/13/24 1932  HCV Virus Hold Specimen  STAT        Placed in "And" Linked Group    Acknowledged JOSEP VIDES.              Virtual Visit Note: The provider triage portion of this emergency department evaluation and documentation was performed via Leila, a " HIPAA-compliant telemedicine application, in concert with a tele-presenter in the room. A face to face patient evaluation with one of my colleagues will occur once the patient is placed in an emergency department room.      DISCLAIMER: This note was prepared with [employer] voice recognition transcription software. Garbled syntax, mangled pronouns, and other bizarre constructions may be attributed to that software system.

## 2024-06-15 LAB — BACTERIA UR CULT: NO GROWTH

## 2024-06-19 ENCOUNTER — TELEPHONE (OUTPATIENT)
Dept: SURGERY | Facility: CLINIC | Age: 54
End: 2024-06-19
Payer: COMMERCIAL

## 2024-06-19 LAB
BACTERIA BLD CULT: NORMAL
BACTERIA BLD CULT: NORMAL

## 2024-06-19 NOTE — TELEPHONE ENCOUNTER
RN called Joe back per message left. Authorization for MRI is in progress.     ----- Message from Baron Batista sent at 6/19/2024  3:33 PM CDT -----  Contact: Kishan/TINY Diaz with [facility] calling to follow up on an authorization for an mri that is scheduled for 6/25 diagnosis code k59.04. Please give him a call back at [phone] ext [phone]

## 2024-06-24 ENCOUNTER — PATIENT MESSAGE (OUTPATIENT)
Dept: SURGERY | Facility: CLINIC | Age: 54
End: 2024-06-24
Payer: COMMERCIAL

## 2024-06-24 DIAGNOSIS — K56.41 OBSTRUCTIVE DEFECATION: Primary | ICD-10-CM

## 2024-06-24 DIAGNOSIS — K58.1 IRRITABLE BOWEL SYNDROME WITH CONSTIPATION: ICD-10-CM

## 2024-06-25 ENCOUNTER — TELEPHONE (OUTPATIENT)
Dept: SURGERY | Facility: CLINIC | Age: 54
End: 2024-06-25
Payer: COMMERCIAL

## 2024-06-25 NOTE — TELEPHONE ENCOUNTER
Called and spoke with the authorization teams manager Dayana regarding authorization for MRI. Dayana stated patients MRI defecography was approved on 06/24/24. She stated the authorization number is [id number] for CPT code 34169 and is authorized for 06/24/24-08/08/24.

## 2024-06-27 ENCOUNTER — OFFICE VISIT (OUTPATIENT)
Dept: SURGERY | Facility: CLINIC | Age: 54
End: 2024-06-27
Payer: COMMERCIAL

## 2024-06-27 ENCOUNTER — HOSPITAL ENCOUNTER (OUTPATIENT)
Dept: RADIOLOGY | Facility: HOSPITAL | Age: 54
Discharge: HOME | End: 2024-06-27
Attending: STUDENT IN AN ORGANIZED HEALTH CARE EDUCATION/TRAINING PROGRAM
Payer: COMMERCIAL

## 2024-06-27 VITALS
DIASTOLIC BLOOD PRESSURE: 71 MMHG | HEIGHT: 69 IN | HEART RATE: 111 BPM | TEMPERATURE: 100 F | BODY MASS INDEX: 22.96 KG/M2 | WEIGHT: 155 LBS | OXYGEN SATURATION: 100 % | SYSTOLIC BLOOD PRESSURE: 109 MMHG

## 2024-06-27 DIAGNOSIS — K58.1 IRRITABLE BOWEL SYNDROME WITH CONSTIPATION: ICD-10-CM

## 2024-06-27 DIAGNOSIS — K59.9 COLONIC INERTIA: Primary | ICD-10-CM

## 2024-06-27 PROCEDURE — 3008F BODY MASS INDEX DOCD: CPT | Mod: CPTII,S$GLB,, | Performed by: STUDENT IN AN ORGANIZED HEALTH CARE EDUCATION/TRAINING PROGRAM

## 2024-06-27 PROCEDURE — 99999 PR PBB SHADOW E&M-EST. PATIENT-LVL IV: CPT | Mod: PBBFAC,,, | Performed by: STUDENT IN AN ORGANIZED HEALTH CARE EDUCATION/TRAINING PROGRAM

## 2024-06-27 PROCEDURE — 74018 RADEX ABDOMEN 1 VIEW: CPT | Mod: TC

## 2024-06-27 PROCEDURE — 1159F MED LIST DOCD IN RCRD: CPT | Mod: CPTII,S$GLB,, | Performed by: STUDENT IN AN ORGANIZED HEALTH CARE EDUCATION/TRAINING PROGRAM

## 2024-06-27 PROCEDURE — 74018 RADEX ABDOMEN 1 VIEW: CPT | Mod: 26,,, | Performed by: RADIOLOGY

## 2024-06-27 PROCEDURE — 3078F DIAST BP <80 MM HG: CPT | Mod: CPTII,S$GLB,, | Performed by: STUDENT IN AN ORGANIZED HEALTH CARE EDUCATION/TRAINING PROGRAM

## 2024-06-27 PROCEDURE — 99215 OFFICE O/P EST HI 40 MIN: CPT | Mod: S$GLB,,, | Performed by: STUDENT IN AN ORGANIZED HEALTH CARE EDUCATION/TRAINING PROGRAM

## 2024-06-27 PROCEDURE — 3074F SYST BP LT 130 MM HG: CPT | Mod: CPTII,S$GLB,, | Performed by: STUDENT IN AN ORGANIZED HEALTH CARE EDUCATION/TRAINING PROGRAM

## 2024-06-27 RX ORDER — SODIUM, POTASSIUM,MAG SULFATES 17.5-3.13G
1 SOLUTION, RECONSTITUTED, ORAL ORAL DAILY
Qty: 1 KIT | Refills: 0 | Status: SHIPPED | OUTPATIENT
Start: 2024-06-27 | End: 2024-06-29

## 2024-06-27 NOTE — PROGRESS NOTES
CRS Office Visit    SUBJECTIVE:     Chief Complaint: constipation    History of Present Illness:  Patient is a 53 y.o. female presents with complaints of severe refractory constipation.  She is currently being worked up for colonic inertia.  She reports her last bowel movement was a proximally 1 month ago.  She was having worsening symptoms of nausea with a belching and indigestion.  Reports significant abdominal bloating.  She continues to pass flatus but has been unable to have a bowel movement.  Recently underwent testing with anorectal manometry and MRI defecography    Colonoscopy: 2023  Internal hemorrhoids, otherwise normal     Pathology: none    Review of patient's allergies indicates:  No Known Allergies    Past Medical History:   Diagnosis Date    ADD (attention deficit disorder)     Hypothyroidism     Irritable bowel syndrome (IBS)     Migraine     Post Traumatic Stress Disorder     Her Son Was A Marine Who Was Killed While Serving In [employer]    Therapeutic Drug Monitoring      Past Surgical History:   Procedure Laterality Date    ANORECTAL MANOMETRY N/A 2024    Procedure: MANOMETRY, ANORECTAL;  Surgeon: Kamini Ibarra, First Available;  Location: [facility];  Service: Endoscopy;  Laterality: N/A;    APPENDECTOMY      AUGMENTATION OF BREAST      BREAST SURGERY      Augmentation      SECTION      COLONOSCOPY  2019    Dr. Wong    HYSTERECTOMY  2010    partial    INGUINAL HERNIA REPAIR Left 2017    KNEE SURGERY Left      Family History   Problem Relation Name Age of Onset    Diabetes Mother      Colon cancer Mother      Prostate cancer Father      Cancer Sister      Breast cancer Paternal Aunt          age unknown    Breast cancer Paternal Aunt          age unknown    COPD Maternal Grandmother      Rheum arthritis Maternal Grandmother      Hypothyroidism Maternal Grandmother      Cancer Maternal Grandfather      Diabetes Paternal Grandmother      Diverticulitis Paternal  Grandmother      Parkinsonism Paternal Grandmother      Prostate cancer Paternal Grandfather      Emphysema Paternal Grandfather          non-smoker     Breast cancer Other 1st cousin         age unknown     Social History     Tobacco Use    Smoking status: Never     Passive exposure: Never    Smokeless tobacco: Never   Substance Use Topics    Alcohol use: No    Drug use: No          OBJECTIVE:     Vital Signs (Most Recent)  Temp: 99.7 °F (37.6 °C) (06/27/24 1435)  Pulse: (!) 111 (06/27/24 1435)  BP: 109/71 (06/27/24 1435)  SpO2: 100 % (06/27/24 1435)    Physical Exam:  Physical Exam  Constitutional:       Appearance: She is well-developed.   HENT:      Head: Normocephalic and atraumatic.   Eyes:      Conjunctiva/sclera: Conjunctivae normal.      Pupils: Pupils are equal, round, and reactive to light.   Neck:      Thyroid: No thyromegaly.   Cardiovascular:      Rate and Rhythm: Normal rate and regular rhythm.   Pulmonary:      Effort: Pulmonary effort is normal. No respiratory distress.   Abdominal:      Comments: Distended, nontender.  No masses   Musculoskeletal:         General: No tenderness. Normal range of motion.      Cervical back: Normal range of motion.   Skin:     General: Skin is warm and dry.      Capillary Refill: Capillary refill takes less than 2 seconds.   Neurological:      General: No focal deficit present.      Mental Status: She is alert and oriented to person, place, and time.         ARM  ANAL SPHINCTER Rapid Pull-Through: RESTING:   - Resting Pressure (Maximum): 65.7 mmHg.   Resting Pressure (Mean): 57.8 mmHg. -   Length (Resting): 3.9 cm. SQUEEZE: -   Squeeze Pressure (Maximum): 269 mmHg. - Duration Maximum Squeeze: 6 seconds. RAIR (Rectoanal Inhibitory Response) Study: -   RAIR Complete: present.   SENSATION Study:   - Sensation Threshold: 20 mL.   - Urge Threshold: 60 mL.   - Pain Threshold (Maximum Tolerated Volume): 90 mL.   - Threshold For Rectal Contractions (> 5 mmHg): 79 mL.  COMPLIANCE Study:   Intrarectal Pressure Change with Balloon Volume Change: - Intrarectal pressure 59.4 mmHg.   Intrarectal % Pressure Change (Compared to Baseline Pressure) with Balloon Volume Change: - Intrarectal pressure 1 % decrease with balloon distention volume 60 mL.   DEFECATION OF BALLOON: - Able to expel 60 mL balloon in 1 minute     Findings:   - Compliance study reveals decreased rectal compliance.   - Consistent with pelvic floor dysfunction.   - Strain maneuver reveals paradoxical response due to anismus  ASSESSMENT/PLAN:     Diagnoses and all orders for this visit:    Colonic inertia    Irritable bowel syndrome with constipation  -     X-Ray Abdomen AP 1 View; Future  -     Ambulatory referral/consult to Physical/Occupational Therapy; Future    Other orders  -     sodium,potassium,mag sulfates (SUPREP BOWEL PREP KIT) 17.5-3.13-1.6 gram SolR; Take 177 mLs by mouth once daily. for 2 days      I had a very extensive discussion with the patient and her  given her diagnosis.  Right now I am currently worried that she was not have had a bowel movement in almost a month.  - we will do an aggressive clean out from above.  Three day colonoscopy prep instructions given including magnesium citrate, Dulcolax and Suprep  - if we are able to get patient to have bowel movements then we can proceed with continuing her workup of colonic inertia with goal of total abdominal colectomy with ileorectal anastomosis.  - reviewed extensively with the patient that the obstructive defecation has to be resolved with pelvic floor physical therapy.  If she were to continue to have constipation would trial diverting loop ileostomy to rule out global inertia  - if she were able to have resolution of her symptoms with diverting loop ileostomy then she would be a candidate for a total abdominal colectomy with ileorectal anastomosis.  - however we can not pursue this workup if she is not having bowel movements at all  - KUB  today  - we will do aggressive bowel regimen over the weekend and check back with the patient Monday  - if she was still unable to have bowel movements we will admit her to the hospital  - should she worsen over the weekend recommended return to the emergency department

## 2024-06-28 DIAGNOSIS — B00.9 HERPES: ICD-10-CM

## 2024-06-28 RX ORDER — VALACYCLOVIR HYDROCHLORIDE 1 G/1
1000 TABLET, FILM COATED ORAL DAILY
Qty: 30 TABLET | Refills: 0 | Status: SHIPPED | OUTPATIENT
Start: 2024-06-28 | End: 2024-07-28

## 2024-07-02 ENCOUNTER — PATIENT MESSAGE (OUTPATIENT)
Dept: SURGERY | Facility: CLINIC | Age: 54
End: 2024-07-02
Payer: COMMERCIAL

## 2024-07-02 RX ORDER — ONDANSETRON 4 MG/1
4 TABLET, ORALLY DISINTEGRATING ORAL 2 TIMES DAILY
Qty: 15 TABLET | Refills: 0 | Status: SHIPPED | OUTPATIENT
Start: 2024-07-02